# Patient Record
Sex: MALE | Race: WHITE | NOT HISPANIC OR LATINO | Employment: FULL TIME | URBAN - NONMETROPOLITAN AREA
[De-identification: names, ages, dates, MRNs, and addresses within clinical notes are randomized per-mention and may not be internally consistent; named-entity substitution may affect disease eponyms.]

---

## 2023-06-12 ENCOUNTER — OFFICE VISIT (OUTPATIENT)
Dept: PSYCHIATRY | Facility: CLINIC | Age: 53
End: 2023-06-12
Payer: COMMERCIAL

## 2023-06-12 VITALS
DIASTOLIC BLOOD PRESSURE: 84 MMHG | HEART RATE: 98 BPM | BODY MASS INDEX: 26.63 KG/M2 | SYSTOLIC BLOOD PRESSURE: 130 MMHG | WEIGHT: 186 LBS | HEIGHT: 70 IN

## 2023-06-12 DIAGNOSIS — F41.1 GENERALIZED ANXIETY DISORDER: ICD-10-CM

## 2023-06-12 DIAGNOSIS — Z13.39 ADHD (ATTENTION DEFICIT HYPERACTIVITY DISORDER) EVALUATION: Primary | ICD-10-CM

## 2023-06-12 DIAGNOSIS — F33.2 SEVERE EPISODE OF RECURRENT MAJOR DEPRESSIVE DISORDER, WITHOUT PSYCHOTIC FEATURES: ICD-10-CM

## 2023-06-12 PROCEDURE — 90792 PSYCH DIAG EVAL W/MED SRVCS: CPT | Performed by: NURSE PRACTITIONER

## 2023-06-12 RX ORDER — PAROXETINE HYDROCHLORIDE 20 MG/1
TABLET, FILM COATED ORAL
COMMUNITY
Start: 2021-03-04

## 2023-06-12 RX ORDER — CEFDINIR 300 MG/1
CAPSULE ORAL
COMMUNITY
Start: 2023-05-24 | End: 2023-06-12

## 2023-06-12 RX ORDER — PRAVASTATIN SODIUM 40 MG
TABLET ORAL
COMMUNITY
Start: 2023-05-02

## 2023-06-12 RX ORDER — FENOFIBRATE 40 MG/1
TABLET ORAL
COMMUNITY
Start: 2023-05-09

## 2023-06-12 RX ORDER — BUPROPION HYDROCHLORIDE 100 MG/1
100 TABLET, EXTENDED RELEASE ORAL 2 TIMES DAILY
Qty: 60 TABLET | Refills: 1 | Status: SHIPPED | OUTPATIENT
Start: 2023-06-12

## 2023-06-12 RX ORDER — FLUTICASONE PROPIONATE 50 MCG
SPRAY, SUSPENSION (ML) NASAL
COMMUNITY
Start: 2015-02-04

## 2023-06-12 RX ORDER — LATANOPROST 50 UG/ML
SOLUTION/ DROPS OPHTHALMIC
COMMUNITY
Start: 2023-06-06

## 2023-06-12 RX ORDER — MONTELUKAST SODIUM 10 MG/1
TABLET ORAL
COMMUNITY
Start: 2023-05-02

## 2023-06-12 RX ORDER — GABAPENTIN ENACARBIL 300 MG/1
TABLET, EXTENDED RELEASE ORAL
COMMUNITY
Start: 2017-05-04

## 2023-06-12 RX ORDER — OMEPRAZOLE 20 MG/1
CAPSULE, DELAYED RELEASE ORAL
COMMUNITY
Start: 2023-05-08

## 2023-06-12 RX ORDER — LORATADINE 10 MG/1
CAPSULE, LIQUID FILLED ORAL
COMMUNITY
Start: 2013-02-04
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
## 2023-08-03 ENCOUNTER — OFFICE VISIT (OUTPATIENT)
Dept: PSYCHIATRY | Facility: CLINIC | Age: 53
End: 2023-08-03
Payer: COMMERCIAL

## 2023-08-03 VITALS
WEIGHT: 188 LBS | HEIGHT: 70 IN | BODY MASS INDEX: 26.92 KG/M2 | HEART RATE: 106 BPM | SYSTOLIC BLOOD PRESSURE: 122 MMHG | DIASTOLIC BLOOD PRESSURE: 84 MMHG

## 2023-08-03 DIAGNOSIS — F33.1 MODERATE EPISODE OF RECURRENT MAJOR DEPRESSIVE DISORDER: ICD-10-CM

## 2023-08-03 DIAGNOSIS — F41.9 ANXIETY: ICD-10-CM

## 2023-08-03 DIAGNOSIS — F90.2 ADHD (ATTENTION DEFICIT HYPERACTIVITY DISORDER), COMBINED TYPE: Primary | ICD-10-CM

## 2023-08-03 PROBLEM — F32.A DEPRESSIVE DISORDER: Status: ACTIVE | Noted: 2018-03-20

## 2023-08-03 PROBLEM — G25.81 RESTLESS LEGS: Status: ACTIVE | Noted: 2018-03-20

## 2023-08-03 PROBLEM — E78.5 HYPERLIPIDEMIA: Status: ACTIVE | Noted: 2018-03-20

## 2023-08-03 PROBLEM — J45.20 MILD INTERMITTENT ASTHMA: Status: ACTIVE | Noted: 2018-03-20

## 2023-08-03 PROBLEM — R11.0 NAUSEA: Status: ACTIVE | Noted: 2019-03-16

## 2023-08-03 PROBLEM — J30.2 SEASONAL ALLERGIC RHINITIS: Status: ACTIVE | Noted: 2018-03-20

## 2023-08-03 PROBLEM — R68.89 CHANGE IN WEIGHT: Status: ACTIVE | Noted: 2019-03-20

## 2023-08-03 PROBLEM — E66.3 OVERWEIGHT: Status: ACTIVE | Noted: 2018-03-20

## 2023-08-03 PROBLEM — Z22.7 INACTIVE TUBERCULOSIS: Status: ACTIVE | Noted: 2018-03-20

## 2023-08-03 RX ORDER — VILOXAZINE HYDROCHLORIDE 200 MG/1
200 CAPSULE, EXTENDED RELEASE ORAL DAILY
Qty: 14 CAPSULE | Refills: 0 | COMMUNITY
Start: 2023-08-03

## 2023-08-03 RX ORDER — VILOXAZINE HYDROCHLORIDE 100 MG/1
100 CAPSULE, EXTENDED RELEASE ORAL DAILY
Qty: 14 CAPSULE | Refills: 0 | COMMUNITY
Start: 2023-08-03

## 2023-08-03 NOTE — PROGRESS NOTES
"Subjective   Aruna Hernandez is a 53 y.o. male who presents today for initial evaluation     Chief Complaint:  Anxiety, depression and poor concentration     History of Present Illness:   History of Present Illness  Aruna Hernandez presents today for medication management follow-up.  Currently taking Paxil (for past several years) that has been managed by PCP. Was recently prescribed Wellbutrin to help with overall focus, concentration and motivation. Wellbutrin was stopped after he noticed increase in anger and irritability, was also causing constipation.  Says that his  also mentioned that he was more \"thomas\" when taking Wellbutrin.  Reports that symptoms resolved once medication was discontinued.  Continues to endorse symptoms of ADHD including, but not limited to: careless mistakes or not paying attention to directions or people of authority, trouble keeping attention on tasks and during hobbies or leisure activities, does not listen when spoken to directly, does not follow instructions and fails to finish homework chores daily tasks or duties at work, avoids dislikes or doesn't want to do things that require mental effort for a long period of time, loses things needed for tasks, easily distracted, forgetful in daily activities, often fidgets with hands or feet or squirms in seat, often is restless, often has trouble enjoying leisure activities quietly, often talks excessively, and often has trouble waiting one's turn which have caused impairment in important areas of daily functioning.    Voices that he does still continue to struggle with some symptoms of depression and anxiety despite taking medication daily.  Symptoms of depression include no motivation and decreased interest in activities. Symptoms of anxiety include feeling anxious, nervous, on edge, worry, difficulty relaxing, easily annoyed and fidgety.  Feels as though many of the symptoms are possibly related to uncontrolled ADHD. Continues to " write both anxiety and depression 0/10 on a 0-10 scale with 10 being the worst. Sleeping about 6 to 7 hours per night, sometimes takes daytime naps. Denies any changes in appetite.  Denies any SI/HI.  PHQ-9 total score: 14, CARLOS EDUARDO-7 total score: 13.    Previous Psych Meds: Zoloft (10 years), currently taking Paxil (x4 years)     The following portions of the patient's history were reviewed and updated as appropriate: allergies, current medications, past family history, past medical history, past social history, past surgical history and problem list.      Past Medical History:  Past Medical History:   Diagnosis Date    ADHD (attention deficit hyperactivity disorder) 2/28/2023    I have been seeing Dyllan Campbell a local therapist who brought it to my attention.  He did not diagnose, he could not do that.    Depression September 1993    Depressive disorder 3/20/2018       Social History:  Social History     Socioeconomic History    Marital status:    Tobacco Use    Smoking status: Never     Passive exposure: Never    Smokeless tobacco: Never   Vaping Use    Vaping Use: Never used   Substance and Sexual Activity    Alcohol use: Not Currently     Comment: Binge drinker.  No longer drink.    Drug use: Never    Sexual activity: Yes     Partners: Male     Birth control/protection: Condom, Same-sex partner       Family History:  Family History   Problem Relation Age of Onset    Depression Mother         Off and on throughout life.    ADD / ADHD Father         Bio-Dad, never known him. Step dad raised me.    Alcohol abuse Father     Depression Father     Alcohol abuse Maternal Grandfather         Passed away 11/1986    Alcohol abuse Sister         Half sister    Depression Sister     Drug abuse Sister     Paranoid behavior Sister        Past Surgical History:  Past Surgical History:   Procedure Laterality Date    ABDOMINAL SURGERY  2018    Gallbladder removed    COLONOSCOPY  2022    ENDOSCOPY  2021    HERNIA REPAIR   "1972    TONSILLECTOMY  2007       Problem List:  Patient Active Problem List   Diagnosis    Seasonal allergic rhinitis    Restless legs    Overweight    Nausea    Mild intermittent asthma    Inactive tuberculosis    Hyperlipidemia    Depressive disorder    Change in weight         Allergy:   Allergies   Allergen Reactions    Azithromycin Unknown - High Severity    Ibuprofen Unknown - High Severity        Current Medications:   Current Outpatient Medications   Medication Sig Dispense Refill    fenofibrate (FENOGLIDE) 40 MG tablet       fluticasone (FLONASE) 50 MCG/ACT nasal spray       Gabapentin Enacarbil ER (Horizant) 300 MG tablet controlled-release       latanoprost (XALATAN) 0.005 % ophthalmic solution       Loratadine 10 MG capsule       montelukast (SINGULAIR) 10 MG tablet       omeprazole (priLOSEC) 20 MG capsule       PARoxetine (PAXIL) 20 MG tablet       pravastatin (PRAVACHOL) 40 MG tablet       Viloxazine HCl ER (Qelbree) 100 MG capsule sustained-release 24 hr Take 1 capsule by mouth Daily. 14 capsule 0    Viloxazine HCl ER (Qelbree) 200 MG capsule sustained-release 24 hr Take 1 capsule by mouth Daily. 14 capsule 0     No current facility-administered medications for this visit.     Review of Systems   Constitutional:  Negative for activity change, appetite change, unexpected weight gain and unexpected weight loss.   Respiratory:  Negative for shortness of breath.    Cardiovascular:  Negative for chest pain.   Psychiatric/Behavioral:  Positive for decreased concentration, dysphoric mood, sleep disturbance and stress. Negative for suicidal ideas. The patient is nervous/anxious.      Physical Exam  Vitals reviewed.   Constitutional:       General: He is not in acute distress.     Appearance: Normal appearance.   Neurological:      Mental Status: He is alert.      Gait: Gait normal.     Vitals:   Blood pressure 122/84, pulse 106, height 177.8 cm (70\"), weight 85.3 kg (188 lb).    Mental Status Exam: "   Hygiene:   good  Cooperation:  Cooperative  Eye Contact:  Good  Psychomotor Behavior:  Appropriate  Affect:  Appropriate  Mood: normal  Hopelessness: Denies  Speech:  Rapid and Rambling  Thought Process:  Goal directed and Linear  Thought Content:  Mood congruent  Suicidal:  None  Homicidal:  None  Hallucinations:  None  Delusion:  None  Memory:  Intact  Orientation:  Person, Place, Time, and Situation  Reliability:  good  Insight:  Good  Judgement:  Good  Impulse Control:  Good    Lab Results:   No visits with results within 6 Month(s) from this visit.   Latest known visit with results is:   No results found for any previous visit.       EKG Results:  No orders to display       Assessment & Plan   Problems Addressed this Visit    None  Visit Diagnoses       ADHD (attention deficit hyperactivity disorder), combined type    -  Primary    Relevant Medications    Viloxazine HCl ER (Qelbree) 100 MG capsule sustained-release 24 hr    Viloxazine HCl ER (Qelbree) 200 MG capsule sustained-release 24 hr    Anxiety        Moderate episode of recurrent major depressive disorder        Relevant Medications    Viloxazine HCl ER (Qelbree) 100 MG capsule sustained-release 24 hr    Viloxazine HCl ER (Qelbree) 200 MG capsule sustained-release 24 hr          Diagnoses         Codes Comments    ADHD (attention deficit hyperactivity disorder), combined type    -  Primary ICD-10-CM: F90.2  ICD-9-CM: 314.01     Anxiety     ICD-10-CM: F41.9  ICD-9-CM: 300.00     Moderate episode of recurrent major depressive disorder     ICD-10-CM: F33.1  ICD-9-CM: 296.32             Visit Diagnoses:    ICD-10-CM ICD-9-CM   1. ADHD (attention deficit hyperactivity disorder), combined type  F90.2 314.01   2. Anxiety  F41.9 300.00   3. Moderate episode of recurrent major depressive disorder  F33.1 296.32     Yarelispita presents today for medication management follow-up.  Wellbutrin was prescribed last visit, but medication was not tolerated due to  irritability, anger and constipation.  Reports symptoms resolved after medication was discontinued.  Feels that anxiety and depression are adequately controlled with Paxil. Continues to struggle with symptoms that strongly aligned with ADHD.  Meets DSM-V criteria of ADHD based on evaluation, also has positive ASRS (6/6 positive in part A).  CPT 3 completed, revealing only 1 atypical T-score, indicating possible issues with sustained attention and vigilance.  Discussed plan of care and medication regimen/options.  Will continue with Paxil 20 mg daily as previously prescribed and managed by PCP.  Will start Qelbree 100 mg daily x 2 weeks, then increase to 200 mg daily.  Denies any adverse effects of current medication regimen.    -Start Qelbree 100 mg daily x 14 days, then increase to 200 mg daily for ADHD symptoms.  (Samples provided)  -Continue Paxil 20 mg daily as prescribed by PCP.    -Reviewed previous available documentation and most recent available labs.   AMAURI reviewed and is appropriate.     GOALS:  Short Term Goals: Patient will be compliant with medication, and patient will have no significant medication related side effects.  Patient will be engaged in psychotherapy as indicated.  Patient will report subjective improvement of symptoms.  Long term goals: To stabilize mood and treat/improve subjective symptoms, the patient will stay out of the hospital, the patient will be at an optimal level of functioning, and the patient will take all medications as prescribed.  The patient/guardian verbalized understanding and agreement with goals that were mutually set.    TREATMENT PLAN: Continue supportive psychotherapy efforts and medications as indicated for patient's diagnosis.  Pharmacological and Non-Pharmacological treatment options discussed during today's visit. Patient/Guardian acknowledged and verbally consented with current treatment plan and was educated on the importance of compliance with treatment  and follow-up appointments.      MEDICATION ISSUES:  Discussed medication options and treatment plan of prescribed medication as well as the risks, benefits, any black box warnings, and side effects including potential falls, possible impaired driving, and metabolic adversities among others. Patient is agreeable to call the office with any worsening of symptoms or onset of side effects, or if any concerns or questions arise.  The contact information for the office is made available to the patient. Patient is agreeable to call 911 or go to the nearest ER should they begin having any SI/HI, or if any urgent concerns arise. No medication side effects or related complaints today.     MEDS ORDERED DURING VISIT:  New Medications Ordered This Visit   Medications    Viloxazine HCl ER (Qelbree) 100 MG capsule sustained-release 24 hr     Sig: Take 1 capsule by mouth Daily.     Dispense:  14 capsule     Refill:  0     Order Specific Question:   Lot Number?     Answer:   [id number]     Order Specific Question:   Expiration Date?     Answer:   1/31/2024     Order Specific Question:   Quantity     Answer:   14    Viloxazine HCl ER (Qelbree) 200 MG capsule sustained-release 24 hr     Sig: Take 1 capsule by mouth Daily.     Dispense:  14 capsule     Refill:  0     Order Specific Question:   Lot Number?     Answer:   [id number]     Order Specific Question:   Expiration Date?     Answer:   1/31/2025     Order Specific Question:   Quantity     Answer:   14       FOLLOW UP:  Return in about 4 weeks (around 8/31/2023) for Recheck.             This document has been electronically signed by CECILIA Walters  August 8, 2023 12:52 EDT    Please note that portions of this note were completed with a voice recognition program. Efforts were made to edit dictation, but occasionally words are mistranscribed.

## 2023-08-29 DIAGNOSIS — F90.2 ADHD (ATTENTION DEFICIT HYPERACTIVITY DISORDER), COMBINED TYPE: ICD-10-CM

## 2023-08-29 RX ORDER — VILOXAZINE HYDROCHLORIDE 200 MG/1
200 CAPSULE, EXTENDED RELEASE ORAL DAILY
Qty: 30 CAPSULE | Refills: 2 | Status: SHIPPED | OUTPATIENT
Start: 2023-08-29

## 2023-08-29 RX ORDER — VILOXAZINE HYDROCHLORIDE 200 MG/1
200 CAPSULE, EXTENDED RELEASE ORAL DAILY
Qty: 14 CAPSULE | Refills: 0 | Status: CANCELLED | COMMUNITY
Start: 2023-08-29

## 2023-08-29 NOTE — TELEPHONE ENCOUNTER
Patient called back and stated that Qelbree 200MG has been working well for him and would like to get a refill of that. States that he started taking in the morning because it was keeping him up at night and it's working really well for him now.

## 2023-08-29 NOTE — TELEPHONE ENCOUNTER
Called to find out how patient was doing on Qelbree 200 MG. Left pt message to call us back at their earliest convenience.

## 2023-09-13 ENCOUNTER — OFFICE VISIT (OUTPATIENT)
Dept: PSYCHIATRY | Facility: CLINIC | Age: 53
End: 2023-09-13
Payer: COMMERCIAL

## 2023-09-13 VITALS
WEIGHT: 180 LBS | DIASTOLIC BLOOD PRESSURE: 78 MMHG | OXYGEN SATURATION: 99 % | HEIGHT: 70 IN | HEART RATE: 102 BPM | SYSTOLIC BLOOD PRESSURE: 114 MMHG | BODY MASS INDEX: 25.77 KG/M2

## 2023-09-13 DIAGNOSIS — F41.9 ANXIETY: ICD-10-CM

## 2023-09-13 DIAGNOSIS — F90.2 ADHD (ATTENTION DEFICIT HYPERACTIVITY DISORDER), COMBINED TYPE: Primary | ICD-10-CM

## 2023-09-13 DIAGNOSIS — F33.1 MODERATE EPISODE OF RECURRENT MAJOR DEPRESSIVE DISORDER: ICD-10-CM

## 2023-09-13 RX ORDER — PAROXETINE HYDROCHLORIDE 40 MG/1
40 TABLET, FILM COATED ORAL EVERY MORNING
COMMUNITY
Start: 2023-08-07

## 2023-09-13 RX ORDER — VILOXAZINE HYDROCHLORIDE 200 MG/1
400 CAPSULE, EXTENDED RELEASE ORAL DAILY
Qty: 60 CAPSULE | Refills: 2 | Status: SHIPPED | OUTPATIENT
Start: 2023-09-13

## 2023-09-13 NOTE — PROGRESS NOTES
Subjective   Aruna Hernandez is a 53 y.o. male who presents today for follow up    Chief Complaint:  Anxiety, depression and poor concentration     History of Present Illness:   History of Present Illness  Aruna Hernandez presents today for medication management follow up. Taking Paxil that is managed by PCP with recent dose increase. Also taking Qelbree that was prescribed last visit here on 8/3/23. Reports that he continues to struggle with inability to complete tasks. Uses example of recently pausing 2 programs for 2 weeks due to inability to finish with trying to complete multiple tasks at one time. Says that house is still a mess, feels that initiating household chores is difficult. Reports being more emotional, low mood, sad and feels could cry at any moment. Recently see PCP and restarted cholesterol medication. He does reports being able to focus and write more poetry. Says that he has noticed no adverse effects since starting Qelbree.  Reports sleeping about 7 hours per night. Denies appetite changes. PHQ-9 Total Score: 14, CARLOS EDUARDO-7 Total Score: 12    Previous Psych Meds: Zoloft (10 years), currently taking Paxil (x4 years), Wellbutrin (anger and irritability), Qelbree (ineffective), W     The following portions of the patient's history were reviewed and updated as appropriate: allergies, current medications, past family history, past medical history, past social history, past surgical history and problem list.      Past Medical History:  Past Medical History:   Diagnosis Date    ADHD (attention deficit hyperactivity disorder) 2/28/2023    I have been seeing Dyllan Campbell a local therapist who brought it to my attention.  He did not diagnose, he could not do that.    Depression September 1993    Depressive disorder 3/20/2018       Social History:  Social History     Socioeconomic History    Marital status:    Tobacco Use    Smoking status: Never     Passive exposure: Never    Smokeless tobacco: Never    Vaping Use    Vaping Use: Never used   Substance and Sexual Activity    Alcohol use: Not Currently     Comment: Binge drinker.  No longer drink.    Drug use: Never    Sexual activity: Yes     Partners: Male     Birth control/protection: Condom, Same-sex partner       Family History:  Family History   Problem Relation Age of Onset    Depression Mother         Off and on throughout life.    ADD / ADHD Father         Bio-Dad, never known him. Step dad raised me.    Alcohol abuse Father     Depression Father     Alcohol abuse Maternal Grandfather         Passed away 11/1986    Alcohol abuse Sister         Half sister    Depression Sister     Drug abuse Sister     Paranoid behavior Sister        Past Surgical History:  Past Surgical History:   Procedure Laterality Date    ABDOMINAL SURGERY  2018    Gallbladder removed    COLONOSCOPY  2022    ENDOSCOPY  2021    HERNIA REPAIR  1972    TONSILLECTOMY  2007       Problem List:  Patient Active Problem List   Diagnosis    Seasonal allergic rhinitis    Restless legs    Overweight    Nausea    Mild intermittent asthma    Inactive tuberculosis    Hyperlipidemia    Depressive disorder    Change in weight         Allergy:   Allergies   Allergen Reactions    Azithromycin Unknown - High Severity    Ibuprofen Unknown - High Severity        Current Medications:   Current Outpatient Medications   Medication Sig Dispense Refill    fenofibrate (FENOGLIDE) 40 MG tablet       fluticasone (FLONASE) 50 MCG/ACT nasal spray       Gabapentin Enacarbil ER (Horizant) 300 MG tablet controlled-release       latanoprost (XALATAN) 0.005 % ophthalmic solution       Loratadine 10 MG capsule       montelukast (SINGULAIR) 10 MG tablet       omeprazole (priLOSEC) 20 MG capsule       PARoxetine (PAXIL) 40 MG tablet Take 1 tablet by mouth Every Morning.      pravastatin (PRAVACHOL) 40 MG tablet       Viloxazine HCl ER (Qelbree) 200 MG capsule sustained-release 24 hr Take 2 capsules by mouth Daily. 60  "capsule 2     No current facility-administered medications for this visit.     Review of Systems   Constitutional:  Negative for activity change, appetite change, unexpected weight gain and unexpected weight loss.   Respiratory:  Negative for shortness of breath.    Cardiovascular:  Negative for chest pain.   Psychiatric/Behavioral:  Positive for decreased concentration, dysphoric mood, sleep disturbance and stress. Negative for suicidal ideas. The patient is nervous/anxious.      Physical Exam  Vitals reviewed.   Constitutional:       General: He is not in acute distress.     Appearance: Normal appearance.   Neurological:      Mental Status: He is alert.      Gait: Gait normal.     Vitals:   Blood pressure 114/78, pulse 102, height 177.8 cm (70\"), weight 81.6 kg (180 lb), SpO2 99 %.    Mental Status Exam:   Hygiene:   good  Cooperation:  Cooperative  Eye Contact:  Good  Psychomotor Behavior:  Appropriate  Affect:  Appropriate  Mood: normal  Hopelessness: Denies  Speech:   Talkative  Thought Process:  Goal directed and Linear  Thought Content:  Mood congruent  Suicidal:  None  Homicidal:  None  Hallucinations:  None  Delusion:  None  Memory:  Intact  Orientation:  Person, Place, Time, and Situation  Reliability:  good  Insight:  Good  Judgement:  Good  Impulse Control:  Good    Lab Results:   No visits with results within 6 Month(s) from this visit.   Latest known visit with results is:   No results found for any previous visit.       EKG Results:  No orders to display       Assessment & Plan   Problems Addressed this Visit    None  Visit Diagnoses       ADHD (attention deficit hyperactivity disorder), combined type    -  Primary    Relevant Medications    PARoxetine (PAXIL) 40 MG tablet    Viloxazine HCl ER (Qelbree) 200 MG capsule sustained-release 24 hr    Anxiety        Moderate episode of recurrent major depressive disorder        Relevant Medications    PARoxetine (PAXIL) 40 MG tablet    Viloxazine HCl ER " (Qelbree) 200 MG capsule sustained-release 24 hr          Diagnoses         Codes Comments    ADHD (attention deficit hyperactivity disorder), combined type    -  Primary ICD-10-CM: F90.2  ICD-9-CM: 314.01     Anxiety     ICD-10-CM: F41.9  ICD-9-CM: 300.00     Moderate episode of recurrent major depressive disorder     ICD-10-CM: F33.1  ICD-9-CM: 296.32             Visit Diagnoses:    ICD-10-CM ICD-9-CM   1. ADHD (attention deficit hyperactivity disorder), combined type  F90.2 314.01   2. Anxiety  F41.9 300.00   3. Moderate episode of recurrent major depressive disorder  F33.1 296.32     Aruna presents today for medication management follow-up.  He voices feeling easily overwhelmed and having increased stressors, especially at work. Reports that he still struggles with ADHD symptoms including difficulty initiating/completing tasks and tends to hyperfocus on unimportant things.  He denies experiencing any adverse effects from calvarium and has noticed no mood changes. He does voice noticing improvement in writing poetry. Also feeling more emotional, PHQ-9 score of 14 indicating moderate depression, CARLOS EDUARDO-7 score of 12 indicating moderate anxiety. Reports that he feels the Paxil works adequately to control anxiety and depression.  Discussed plan and medications, will increase Qelbree from 200 mg to 400 mg daily to help with better control of ADHD symptoms.  Will continue with Paxil 40 mg as managed by PCP.  Denies any adverse effects of current medication regimen.    -Increase Qelbree from 200 mg to 400 mg daily for ADHD symptoms.   -Continue Paxil 40 mg daily as prescribed by PCP.    -Reviewed previous available documentation and most recent available labs. AMAURI reviewed and is appropriate.     GOALS:  Short Term Goals: Patient will be compliant with medication, and patient will have no significant medication related side effects.  Patient will be engaged in psychotherapy as indicated.  Patient will report  subjective improvement of symptoms.  Long term goals: To stabilize mood and treat/improve subjective symptoms, the patient will stay out of the hospital, the patient will be at an optimal level of functioning, and the patient will take all medications as prescribed.  The patient/guardian verbalized understanding and agreement with goals that were mutually set.    TREATMENT PLAN: Continue supportive psychotherapy efforts and medications as indicated for patient's diagnosis.  Pharmacological and Non-Pharmacological treatment options discussed during today's visit. Patient/Guardian acknowledged and verbally consented with current treatment plan and was educated on the importance of compliance with treatment and follow-up appointments.      MEDICATION ISSUES:  Discussed medication options and treatment plan of prescribed medication as well as the risks, benefits, any black box warnings, and side effects including potential falls, possible impaired driving, and metabolic adversities among others. Patient is agreeable to call the office with any worsening of symptoms or onset of side effects, or if any concerns or questions arise.  The contact information for the office is made available to the patient. Patient is agreeable to call 911 or go to the nearest ER should they begin having any SI/HI, or if any urgent concerns arise. No medication side effects or related complaints today.     MEDS ORDERED DURING VISIT:  New Medications Ordered This Visit   Medications    Viloxazine HCl ER (Qelbree) 200 MG capsule sustained-release 24 hr     Sig: Take 2 capsules by mouth Daily.     Dispense:  60 capsule     Refill:  2       FOLLOW UP:  Return in about 6 weeks (around 10/25/2023) for Recheck.             This document has been electronically signed by CECILIA Walters  September 27, 2023 19:59 EDT    Please note that portions of this note were completed with a voice recognition program. Efforts were made to edit dictation, but  occasionally words are mistranscribed.

## 2023-10-26 ENCOUNTER — OFFICE VISIT (OUTPATIENT)
Dept: PSYCHIATRY | Facility: CLINIC | Age: 53
End: 2023-10-26
Payer: COMMERCIAL

## 2023-10-26 VITALS
OXYGEN SATURATION: 98 % | WEIGHT: 170 LBS | DIASTOLIC BLOOD PRESSURE: 62 MMHG | HEIGHT: 70 IN | BODY MASS INDEX: 24.34 KG/M2 | SYSTOLIC BLOOD PRESSURE: 120 MMHG | HEART RATE: 113 BPM

## 2023-10-26 DIAGNOSIS — F33.1 MODERATE EPISODE OF RECURRENT MAJOR DEPRESSIVE DISORDER: ICD-10-CM

## 2023-10-26 DIAGNOSIS — Z79.899 MEDICATION MANAGEMENT: ICD-10-CM

## 2023-10-26 DIAGNOSIS — F90.2 ADHD (ATTENTION DEFICIT HYPERACTIVITY DISORDER), COMBINED TYPE: Primary | ICD-10-CM

## 2023-10-26 DIAGNOSIS — F41.9 ANXIETY: ICD-10-CM

## 2023-10-26 RX ORDER — DEXTROAMPHETAMINE SACCHARATE, AMPHETAMINE ASPARTATE MONOHYDRATE, DEXTROAMPHETAMINE SULFATE AND AMPHETAMINE SULFATE 2.5; 2.5; 2.5; 2.5 MG/1; MG/1; MG/1; MG/1
10 CAPSULE, EXTENDED RELEASE ORAL DAILY
Qty: 15 CAPSULE | Refills: 0 | Status: SHIPPED | OUTPATIENT
Start: 2023-10-26 | End: 2023-11-07 | Stop reason: SDUPTHER

## 2023-10-26 NOTE — PROGRESS NOTES
Subjective   Aruna Hernandez is a 53 y.o. male who presents today for follow up    Chief Complaint:  Anxiety, depression and poor concentration     History of Present Illness:   History of Present Illness  Aruna Hernandez presents today for medication management follow-up.  Currently taking Qelbree 200 mg daily and Paxil 40 mg daily that was initially prescribed and is currently managed by PCP.  Voices that he continues to struggle with ADHD symptoms, struggles to initiate/complete tasks, becomes easily distracted, has multiple tasks going at once and is easily overwhelmed.  Says that he is also forgetful, frequently makes lists then loses the list.  Reports increased stressors at work that have resulted in increased anxiety and depression.  Reports that he is sleeping well, no longer napping during the day.  Says that he has been exercising and walking the dogs.  Continues to see therapist routinely.  Denies appetite changes.  Denies SI/HI.  PHQ-9 total score: 11, CARLOS EDUARDO-7 total score: 14.    Previous Psych Meds: Zoloft (10 years), currently taking Paxil (x4 years), Wellbutrin (anger and irritability), Qelbree (ineffective)     The following portions of the patient's history were reviewed and updated as appropriate: allergies, current medications, past family history, past medical history, past social history, past surgical history and problem list.      Past Medical History:  Past Medical History:   Diagnosis Date    ADHD (attention deficit hyperactivity disorder) 2/28/2023    I have been seeing Dyllan Campbell a local therapist who brought it to my attention.  He did not diagnose, he could not do that.    Depression September 1993    Depressive disorder 3/20/2018       Social History:  Social History     Socioeconomic History    Marital status:    Tobacco Use    Smoking status: Never     Passive exposure: Never    Smokeless tobacco: Never   Vaping Use    Vaping Use: Never used   Substance and Sexual Activity     Alcohol use: Not Currently     Comment: Binge drinker.  No longer drink.    Drug use: Never    Sexual activity: Yes     Partners: Male     Birth control/protection: Condom, Same-sex partner       Family History:  Family History   Problem Relation Age of Onset    Depression Mother         Off and on throughout life.    ADD / ADHD Father         Bio-Dad, never known him. Step dad raised me.    Alcohol abuse Father     Depression Father     Alcohol abuse Maternal Grandfather         Passed away 11/1986    Alcohol abuse Sister         Half sister    Depression Sister     Drug abuse Sister     Paranoid behavior Sister        Past Surgical History:  Past Surgical History:   Procedure Laterality Date    ABDOMINAL SURGERY  2018    Gallbladder removed    COLONOSCOPY  2022    ENDOSCOPY  2021    HERNIA REPAIR  1972    TONSILLECTOMY  2007       Problem List:  Patient Active Problem List   Diagnosis    Seasonal allergic rhinitis    Restless legs    Overweight    Nausea    Mild intermittent asthma    Inactive tuberculosis    Hyperlipidemia    Depressive disorder    Change in weight         Allergy:   Allergies   Allergen Reactions    Azithromycin Unknown - High Severity    Ibuprofen Unknown - High Severity        Current Medications:   Current Outpatient Medications   Medication Sig Dispense Refill    fenofibrate (FENOGLIDE) 40 MG tablet       fluticasone (FLONASE) 50 MCG/ACT nasal spray       Gabapentin Enacarbil ER (Horizant) 300 MG tablet controlled-release       latanoprost (XALATAN) 0.005 % ophthalmic solution       Loratadine 10 MG capsule       montelukast (SINGULAIR) 10 MG tablet       omeprazole (priLOSEC) 20 MG capsule       PARoxetine (PAXIL) 40 MG tablet Take 1 tablet by mouth Every Morning.      pravastatin (PRAVACHOL) 40 MG tablet       amphetamine-dextroamphetamine XR (Adderall XR) 10 MG 24 hr capsule Take 1 capsule by mouth Every Morning 30 capsule 0     No current facility-administered medications for this  "visit.     Review of Systems   Constitutional:  Negative for activity change, appetite change, unexpected weight gain and unexpected weight loss.   Respiratory:  Negative for shortness of breath.    Cardiovascular:  Negative for chest pain.   Psychiatric/Behavioral:  Positive for decreased concentration, dysphoric mood, sleep disturbance and stress. Negative for suicidal ideas. The patient is nervous/anxious.      Physical Exam  Vitals reviewed.   Constitutional:       General: He is not in acute distress.     Appearance: Normal appearance.   Neurological:      Mental Status: He is alert.      Gait: Gait normal.     Vitals:   Blood pressure 120/62, pulse 113, height 177.8 cm (70\"), weight 77.1 kg (170 lb), SpO2 98%.    Mental Status Exam:   Hygiene:   good  Cooperation:  Cooperative  Eye Contact:  Good  Psychomotor Behavior:  Appropriate  Affect:  Appropriate  Mood: normal  Hopelessness: Denies  Speech:   Talkative  Thought Process:  Goal directed and Linear  Thought Content:  Mood congruent  Suicidal:  None  Homicidal:  None  Hallucinations:  None  Delusion:  None  Memory:  Intact  Orientation:  Person, Place, Time, and Situation  Reliability:  good  Insight:  Good  Judgement:  Good  Impulse Control:  Good    Lab Results:   Office Visit on 10/26/2023   Component Date Value Ref Range Status    Report Summary 10/26/2023 FINAL   Final    Comment: ====================================================================  [facility] DRUG ANALYSIS,[facility]  ====================================================================  Test                             Result       Flag       Units  Drug Present and Declared for Prescription Verification    Paroxetine                     PRESENT      EXPECTED  Drug Present not Declared for Prescription Verification    Tizanidine                     PRESENT      UNEXPECTED  Drug Absent but Declared for Prescription Verification    Gabapentin                     Not Detected " UNEXPECTED  ====================================================================  Test                      Result    Flag   Units      Ref Range    Creatinine              133              mg/dL      >=20  ====================================================================  Declared Medications:   The flagging and interpretation on this report are based on the   following declared medications.  Unexpected results may arise from                              inaccuracies in the declared medications.   **Note: The testing scope of this panel includes these medications:   Gabapentin   Paroxetine   **Note: The testing scope of this panel does not include following   reported medications:   Fenofibrate   Fluticasone   Latanoprost   Loratadine   Montelukast   Omeprazole   Pravastatin   Viloxazine  ====================================================================  For clinical consultation, please call [phone].  ====================================================================         EKG Results:  No orders to display       Assessment & Plan   Problems Addressed this Visit    None  Visit Diagnoses       ADHD (attention deficit hyperactivity disorder), combined type    -  Primary    Relevant Orders    Compliance Drug Analysis, Ur - Urine, Clean Catch (Completed)    Medication management        Relevant Orders    Compliance Drug Analysis, Ur - Urine, Clean Catch (Completed)    Moderate episode of recurrent major depressive disorder        Anxiety              Diagnoses         Codes Comments    ADHD (attention deficit hyperactivity disorder), combined type    -  Primary ICD-10-CM: F90.2  ICD-9-CM: 314.01     Medication management     ICD-10-CM: Z79.899  ICD-9-CM: V58.69     Moderate episode of recurrent major depressive disorder     ICD-10-CM: F33.1  ICD-9-CM: 296.32     Anxiety     ICD-10-CM: F41.9  ICD-9-CM: 300.00             Visit Diagnoses:    ICD-10-CM ICD-9-CM   1. ADHD (attention deficit hyperactivity  disorder), combined type  F90.2 314.01   2. Medication management  Z79.899 V58.69   3. Moderate episode of recurrent major depressive disorder  F33.1 296.32   4. Anxiety  F41.9 300.00     Quinton presents today for medication management follow-up.  Reports increase in depression that could likely be situational as he is having difficulty at work.  Continues to struggle with ADHD symptoms despite taking Qelbree.  Voices that he does not feel medication has been beneficial in decreasing ADHD symptoms.  PHQ-9 score of 11 indicating moderate depression, CARLOS EDUARDO-7 score of 14 indicating moderate anxiety.  Discussed plan of care and medication regimen/options.  Will taper/discontinue Qelbree and start Adderall XR 10 mg daily.  Agreeable to have UDS completed today, will send Adderall XR 10 mg x 15 days once results have been received and he will call office to update. Agreeable to continue with Paxil 40 mg daily as previously prescribed by PCP.  Denies any adverse effects of medication.    -Start Adderall XR 10 mg daily x 15 days once UDS results have been received  -Continue Paxil 40 mg daily as prescribed by PCP.    -Reviewed previous available documentation and most recent available labs. AMAURI reviewed and is appropriate.     GOALS:  Short Term Goals: Patient will be compliant with medication, and patient will have no significant medication related side effects.  Patient will be engaged in psychotherapy as indicated.  Patient will report subjective improvement of symptoms.  Long term goals: To stabilize mood and treat/improve subjective symptoms, the patient will stay out of the hospital, the patient will be at an optimal level of functioning, and the patient will take all medications as prescribed.  The patient/guardian verbalized understanding and agreement with goals that were mutually set.    TREATMENT PLAN: Continue supportive psychotherapy efforts and medications as indicated for patient's diagnosis.   Pharmacological and Non-Pharmacological treatment options discussed during today's visit. Patient/Guardian acknowledged and verbally consented with current treatment plan and was educated on the importance of compliance with treatment and follow-up appointments.      MEDICATION ISSUES:  Discussed medication options and treatment plan of prescribed medication as well as the risks, benefits, any black box warnings, and side effects including potential falls, possible impaired driving, and metabolic adversities among others. Patient is agreeable to call the office with any worsening of symptoms or onset of side effects, or if any concerns or questions arise.  The contact information for the office is made available to the patient. Patient is agreeable to call 911 or go to the nearest ER should they begin having any SI/HI, or if any urgent concerns arise. No medication side effects or related complaints today.     MEDS ORDERED DURING VISIT:  No orders of the defined types were placed in this encounter.      FOLLOW UP:  Return in about 6 weeks (around 12/7/2023) for Recheck.             This document has been electronically signed by CECILIA Walters  November 9, 2023 23:52 EST    Please note that portions of this note were completed with a voice recognition program. Efforts were made to edit dictation, but occasionally words are mistranscribed.

## 2023-11-02 LAB — DRUGS UR: NORMAL

## 2023-11-07 ENCOUNTER — TELEPHONE (OUTPATIENT)
Dept: PSYCHIATRY | Facility: CLINIC | Age: 53
End: 2023-11-07
Payer: COMMERCIAL

## 2023-11-07 DIAGNOSIS — F90.2 ADHD (ATTENTION DEFICIT HYPERACTIVITY DISORDER), COMBINED TYPE: ICD-10-CM

## 2023-11-07 RX ORDER — DEXTROAMPHETAMINE SACCHARATE, AMPHETAMINE ASPARTATE MONOHYDRATE, DEXTROAMPHETAMINE SULFATE AND AMPHETAMINE SULFATE 2.5; 2.5; 2.5; 2.5 MG/1; MG/1; MG/1; MG/1
10 CAPSULE, EXTENDED RELEASE ORAL EVERY MORNING
Qty: 30 CAPSULE | Refills: 0 | Status: SHIPPED | OUTPATIENT
Start: 2023-11-07

## 2023-11-07 NOTE — TELEPHONE ENCOUNTER
Patient states that the Adderall XR has not caused any negative side effects. He said he is doing a little bit better. He also asked about urine drug screen report that he didn't recognize. He states he has not taken Tizanidine and does not know where this has come from.

## 2023-12-15 DIAGNOSIS — F90.2 ADHD (ATTENTION DEFICIT HYPERACTIVITY DISORDER), COMBINED TYPE: ICD-10-CM

## 2023-12-18 RX ORDER — DEXTROAMPHETAMINE SACCHARATE, AMPHETAMINE ASPARTATE MONOHYDRATE, DEXTROAMPHETAMINE SULFATE AND AMPHETAMINE SULFATE 2.5; 2.5; 2.5; 2.5 MG/1; MG/1; MG/1; MG/1
10 CAPSULE, EXTENDED RELEASE ORAL EVERY MORNING
Qty: 30 CAPSULE | Refills: 0 | Status: SHIPPED | OUTPATIENT
Start: 2023-12-18 | End: 2023-12-21 | Stop reason: DRUGHIGH

## 2023-12-21 ENCOUNTER — OFFICE VISIT (OUTPATIENT)
Dept: PSYCHIATRY | Facility: CLINIC | Age: 53
End: 2023-12-21
Payer: COMMERCIAL

## 2023-12-21 VITALS
BODY MASS INDEX: 25.62 KG/M2 | WEIGHT: 179 LBS | SYSTOLIC BLOOD PRESSURE: 134 MMHG | OXYGEN SATURATION: 99 % | HEART RATE: 94 BPM | HEIGHT: 70 IN | DIASTOLIC BLOOD PRESSURE: 86 MMHG

## 2023-12-21 DIAGNOSIS — F90.2 ADHD (ATTENTION DEFICIT HYPERACTIVITY DISORDER), COMBINED TYPE: ICD-10-CM

## 2023-12-21 PROCEDURE — 99214 OFFICE O/P EST MOD 30 MIN: CPT | Performed by: NURSE PRACTITIONER

## 2023-12-21 RX ORDER — DEXTROAMPHETAMINE SACCHARATE, AMPHETAMINE ASPARTATE MONOHYDRATE, DEXTROAMPHETAMINE SULFATE AND AMPHETAMINE SULFATE 5; 5; 5; 5 MG/1; MG/1; MG/1; MG/1
20 CAPSULE, EXTENDED RELEASE ORAL EVERY MORNING
Qty: 30 CAPSULE | Refills: 0 | Status: SHIPPED | OUTPATIENT
Start: 2023-12-21

## 2023-12-21 NOTE — PROGRESS NOTES
Subjective   Aruna Hernandez is a 53 y.o. male who presents today for follow up    Chief Complaint:  Anxiety, depression and poor concentration     History of Present Illness:   History of Present Illness  Aruna Hernandez presents today for medication management follow-up.  Currently taking Adderall XR 10 mg daily and Paxil 40 mg daily.  Paxil is currently managed by PCP.  Reports that he has been really busy with work and house renovations.  Has noticed no significant improvement in ADHD symptoms since starting medication.  Continues to become easily distracted, loses items needed for daily tasks and struggles to maintain focus. Recently started back to school ([employer]), needs 4 classes to graduate with a bachelor's in business management and hopes to obtain master's degree. Says that he recently had an incident at work, spoken to by upper management for sending emails that someone felt were offensive.  Reports that he is sleeping well and appetite is good. PHQ-9 total score: 9 (previously 11), CARLOS EDUARDO-7 total score: 7 (previously 14).    Previous Psych Meds: Zoloft (10 years), currently taking Paxil (x4 years), Wellbutrin (anger and irritability), Qelbree (ineffective)     The following portions of the patient's history were reviewed and updated as appropriate: allergies, current medications, past family history, past medical history, past social history, past surgical history and problem list.      Past Medical History:  Past Medical History:   Diagnosis Date    ADHD (attention deficit hyperactivity disorder) 2/28/2023    I have been seeing Dyllan Campbell a local therapist who brought it to my attention.  He did not diagnose, he could not do that.    Depression September 1993    Depressive disorder 03/20/2018       Social History:  Social History     Socioeconomic History    Marital status:    Tobacco Use    Smoking status: Never     Passive exposure: Never    Smokeless tobacco: Never   Vaping Use    Vaping  Use: Never used   Substance and Sexual Activity    Alcohol use: Not Currently     Comment: Binge drinker.  No longer drink.    Drug use: Never    Sexual activity: Yes     Partners: Male     Birth control/protection: Condom, Same-sex partner       Family History:  Family History   Problem Relation Age of Onset    Depression Mother         Off and on throughout life.    ADD / ADHD Father         Bio-Dad, never known him. Step dad raised me.    Alcohol abuse Father     Depression Father     Alcohol abuse Maternal Grandfather         Passed away 11/1986    Alcohol abuse Sister         Half sister    Depression Sister     Drug abuse Sister     Paranoid behavior Sister        Past Surgical History:  Past Surgical History:   Procedure Laterality Date    ABDOMINAL SURGERY  2018    Gallbladder removed    COLONOSCOPY  2022    ENDOSCOPY  2021    HERNIA REPAIR  1972    TONSILLECTOMY  2007       Problem List:  Patient Active Problem List   Diagnosis    Seasonal allergic rhinitis    Restless legs    Overweight    Nausea    Mild intermittent asthma    Inactive tuberculosis    Hyperlipidemia    Depressive disorder    Change in weight         Allergy:   Allergies   Allergen Reactions    Azithromycin Unknown - High Severity    Ibuprofen Unknown - High Severity        Current Medications:   Current Outpatient Medications   Medication Sig Dispense Refill    fenofibrate (FENOGLIDE) 40 MG tablet       fluticasone (FLONASE) 50 MCG/ACT nasal spray       Gabapentin Enacarbil ER (Horizant) 300 MG tablet controlled-release       latanoprost (XALATAN) 0.005 % ophthalmic solution       Loratadine 10 MG capsule       montelukast (SINGULAIR) 10 MG tablet       omeprazole (priLOSEC) 20 MG capsule       PARoxetine (PAXIL) 40 MG tablet Take 1 tablet by mouth Every Morning.      pravastatin (PRAVACHOL) 40 MG tablet       amphetamine-dextroamphetamine XR (Adderall XR) 20 MG 24 hr capsule Take 1 capsule by mouth Every Morning 30 capsule 0     No  "current facility-administered medications for this visit.     Review of Systems   Constitutional:  Negative for activity change, appetite change, unexpected weight gain and unexpected weight loss.   Respiratory:  Negative for shortness of breath.    Cardiovascular:  Negative for chest pain.   Psychiatric/Behavioral:  Positive for decreased concentration, dysphoric mood, sleep disturbance and stress. Negative for suicidal ideas. The patient is nervous/anxious.      Physical Exam  Vitals reviewed.   Constitutional:       General: He is not in acute distress.     Appearance: Normal appearance.   Neurological:      Mental Status: He is alert.      Gait: Gait normal.     Vitals:   Blood pressure 134/86, pulse 94, height 177.8 cm (70\"), weight 81.2 kg (179 lb), SpO2 99%.    Mental Status Exam:   Hygiene:   good  Cooperation:  Cooperative  Eye Contact:  Good  Psychomotor Behavior:  Appropriate  Affect:  Appropriate  Mood: normal  Hopelessness: Denies  Speech:   Talkative  Thought Process:  Goal directed and Linear  Thought Content:  Mood congruent  Suicidal:  None  Homicidal:  None  Hallucinations:  None  Delusion:  None  Memory:  Intact  Orientation:  Person, Place, Time, and Situation  Reliability:  good  Insight:  Good  Judgement:  Good  Impulse Control:  Good    Lab Results:   Office Visit on 10/26/2023   Component Date Value Ref Range Status    Report Summary 10/26/2023 FINAL   Final    Comment: ====================================================================  [facility] DRUG ANALYSIS[facility]  ====================================================================  Test                             Result       Flag       Units  Drug Present and Declared for Prescription Verification    Paroxetine                     PRESENT      EXPECTED  Drug Present not Declared for Prescription Verification    Tizanidine                     PRESENT      UNEXPECTED  Drug Absent but Declared for Prescription Verification    " Gabapentin                     Not Detected UNEXPECTED  ====================================================================  Test                      Result    Flag   Units      Ref Range    Creatinine              133              mg/dL      >=20  ====================================================================  Declared Medications:   The flagging and interpretation on this report are based on the   following declared medications.  Unexpected results may arise from                              inaccuracies in the declared medications.   **Note: The testing scope of this panel includes these medications:   Gabapentin   Paroxetine   **Note: The testing scope of this panel does not include following   reported medications:   Fenofibrate   Fluticasone   Latanoprost   Loratadine   Montelukast   Omeprazole   Pravastatin   Viloxazine  ====================================================================  For clinical consultation, please call [phone].  ====================================================================         EKG Results:  No orders to display       Assessment & Plan   Problems Addressed this Visit    None  Visit Diagnoses       ADHD (attention deficit hyperactivity disorder), combined type        Relevant Medications    amphetamine-dextroamphetamine XR (Adderall XR) 20 MG 24 hr capsule          Diagnoses         Codes Comments    ADHD (attention deficit hyperactivity disorder), combined type     ICD-10-CM: F90.2  ICD-9-CM: 314.01             Visit Diagnoses:    ICD-10-CM ICD-9-CM   1. ADHD (attention deficit hyperactivity disorder), combined type  F90.2 314.01     Craigory presents today for medication management follow-up.  Has noticed no improvement in ADHD symptoms since starting Adderall XR 10 mg daily.  Continues to struggle with being easily distracted, forgetful and losing items needed daily tasks.  Also struggles with initiating and completing tasks.  Feels that overall anxiety and  depression are adequately controlled with Paxil.  Discussed plan and medication regimen, agreeable to increase Adderall XR from 10 mg to 20 mg daily help with better management of ADHD symptoms.  Will continue with Paxil 40 mg daily is managed by PCP.  Denies any adverse effects of medication regimen.    -Increase Adderall XR from 10 mg to 20 mg daily (last Rx obtained 12/21, we will increase to 20 mg daily with current prescription. This will last a total of 14 days, then he will call to update and request refill of new dose if appropriate)  -Continue Paxil 40 mg daily as prescribed by PCP.    -Reviewed previous available documentation and most recent available labs. AMAURI reviewed and is appropriate.     GOALS:  Short Term Goals: Patient will be compliant with medication, and patient will have no significant medication related side effects.  Patient will be engaged in psychotherapy as indicated.  Patient will report subjective improvement of symptoms.  Long term goals: To stabilize mood and treat/improve subjective symptoms, the patient will stay out of the hospital, the patient will be at an optimal level of functioning, and the patient will take all medications as prescribed.  The patient/guardian verbalized understanding and agreement with goals that were mutually set.    TREATMENT PLAN: Continue supportive psychotherapy efforts and medications as indicated for patient's diagnosis.  Pharmacological and Non-Pharmacological treatment options discussed during today's visit. Patient/Guardian acknowledged and verbally consented with current treatment plan and was educated on the importance of compliance with treatment and follow-up appointments.      MEDICATION ISSUES:  Discussed medication options and treatment plan of prescribed medication as well as the risks, benefits, any black box warnings, and side effects including potential falls, possible impaired driving, and metabolic adversities among others. Patient  is agreeable to call the office with any worsening of symptoms or onset of side effects, or if any concerns or questions arise.  The contact information for the office is made available to the patient. Patient is agreeable to call 911 or go to the nearest ER should they begin having any SI/HI, or if any urgent concerns arise. No medication side effects or related complaints today.     MEDS ORDERED DURING VISIT:  New Medications Ordered This Visit   Medications    amphetamine-dextroamphetamine XR (Adderall XR) 20 MG 24 hr capsule     Sig: Take 1 capsule by mouth Every Morning     Dispense:  30 capsule     Refill:  0       FOLLOW UP:  Return in about 6 weeks (around 2/1/2024) for Recheck.             This document has been electronically signed by CECILIA Walters  January 2, 2024 11:13 EST    Please note that portions of this note were completed with a voice recognition program. Efforts were made to edit dictation, but occasionally words are mistranscribed.

## 2024-01-21 DIAGNOSIS — F90.2 ADHD (ATTENTION DEFICIT HYPERACTIVITY DISORDER), COMBINED TYPE: ICD-10-CM

## 2024-01-21 RX ORDER — DEXTROAMPHETAMINE SACCHARATE, AMPHETAMINE ASPARTATE MONOHYDRATE, DEXTROAMPHETAMINE SULFATE AND AMPHETAMINE SULFATE 5; 5; 5; 5 MG/1; MG/1; MG/1; MG/1
20 CAPSULE, EXTENDED RELEASE ORAL EVERY MORNING
Qty: 30 CAPSULE | Refills: 0 | Status: CANCELLED | OUTPATIENT
Start: 2024-01-21

## 2024-01-22 RX ORDER — DEXTROAMPHETAMINE SACCHARATE, AMPHETAMINE ASPARTATE MONOHYDRATE, DEXTROAMPHETAMINE SULFATE AND AMPHETAMINE SULFATE 6.25; 6.25; 6.25; 6.25 MG/1; MG/1; MG/1; MG/1
25 CAPSULE, EXTENDED RELEASE ORAL DAILY
Qty: 30 CAPSULE | Refills: 0 | Status: SHIPPED | OUTPATIENT
Start: 2024-01-22

## 2024-02-01 ENCOUNTER — OFFICE VISIT (OUTPATIENT)
Dept: PSYCHIATRY | Facility: CLINIC | Age: 54
End: 2024-02-01
Payer: COMMERCIAL

## 2024-02-01 VITALS
OXYGEN SATURATION: 97 % | HEART RATE: 100 BPM | DIASTOLIC BLOOD PRESSURE: 86 MMHG | BODY MASS INDEX: 24.97 KG/M2 | HEIGHT: 70 IN | SYSTOLIC BLOOD PRESSURE: 130 MMHG | WEIGHT: 174.4 LBS

## 2024-02-01 DIAGNOSIS — F41.1 GENERALIZED ANXIETY DISORDER: ICD-10-CM

## 2024-02-01 DIAGNOSIS — F90.2 ADHD (ATTENTION DEFICIT HYPERACTIVITY DISORDER), COMBINED TYPE: Primary | ICD-10-CM

## 2024-02-06 ENCOUNTER — TELEPHONE (OUTPATIENT)
Dept: PSYCHIATRY | Facility: CLINIC | Age: 54
End: 2024-02-06
Payer: COMMERCIAL

## 2024-02-06 DIAGNOSIS — F90.2 ADHD (ATTENTION DEFICIT HYPERACTIVITY DISORDER), COMBINED TYPE: Primary | ICD-10-CM

## 2024-02-06 NOTE — TELEPHONE ENCOUNTER
Patient held the adderall over the weekend and did not notice a difference at all. Please advise on increase or alternative med. Took it today and still not noticing a difference.

## 2024-02-06 NOTE — TELEPHONE ENCOUNTER
Patient states he would try the increased dose. Please advise on the dose. Ok to leave a detailed message.

## 2024-02-07 RX ORDER — LISDEXAMFETAMINE DIMESYLATE CAPSULES 30 MG/1
30 CAPSULE ORAL EVERY MORNING
Qty: 30 CAPSULE | Refills: 0 | Status: SHIPPED | OUTPATIENT
Start: 2024-02-07

## 2024-02-07 NOTE — TELEPHONE ENCOUNTER
Spoke to Aruna, agreeable to stop Adderall XR due to ineffectiveness and start Vyvanse 30 mg daily. RX sent to pharmacy.

## 2024-02-13 ENCOUNTER — OFFICE VISIT (OUTPATIENT)
Dept: PSYCHIATRY | Facility: CLINIC | Age: 54
End: 2024-02-13
Payer: COMMERCIAL

## 2024-02-13 DIAGNOSIS — F41.1 GENERALIZED ANXIETY DISORDER: Primary | ICD-10-CM

## 2024-02-13 DIAGNOSIS — F33.1 MODERATE EPISODE OF RECURRENT MAJOR DEPRESSIVE DISORDER: ICD-10-CM

## 2024-02-13 PROCEDURE — 90837 PSYTX W PT 60 MINUTES: CPT | Performed by: COUNSELOR

## 2024-02-13 NOTE — PROGRESS NOTES
Date:2024   Patient Name: Aruna Hernandez  : 1970   MRN: [id number]   Time IN: 12:05 PM    Time OUT: 1:16 PM     Referring Provider: Amy Conrad MD    PROGRESS NOTE    History of Present Illness:   Aruna Hernandez is a 53 y.o. male who is being seen today for follow up individual Psychotherapy session.     Chief Complaint:  Pt struggles with depression, anxiety, has a trauma hx and was diagnosed at our clinic with ADHD combined type.  Pt is attended at our clinic for med mgt - since last May.  Pt has a therapist he see's for family issues outside of our clinic.  Pt is wanting to work on trauma hx.              ICD-10-CM ICD-9-CM   1. Generalized anxiety disorder  F41.1 300.02   2. Moderate episode of recurrent major depressive disorder  F33.1 296.32          Clinical Maneuvering/Intervention:   Assisted patient in processing above session content; acknowledged and normalized patient’s thoughts, feelings, and concerns to build appropriate rapport and a positive therapeutic relationship with open and honest communication.  Processed and rationalized patients thoughts and feelings regarding adverse life experiences/trauma, managing MH.  Discussed triggers associated with patient's anxiety/depression.  Also discussed coping skills for patient to implement such as processing details/thoughts/feelings related to trauma, resolving cognitive distortions from trauma, leaning on positive supports.    Allowed patient to freely discuss issues without interruption or judgment. Provided safe, confidential environment to facilitate the development of positive therapeutic relationship and encourage open, honest communication. Assisted patient in identifying risk factors which would indicate the need for higher level of care including thoughts to harm self or others and/or self-harming behavior and encouraged patient to contact this office, call 911, or present to the nearest emergency room should any of these  events occur. Discussed crisis intervention services and means to access. Patient adamantly and convincingly denies current suicidal or homicidal ideation or perceptual disturbance.    Mental Status Exam:   Hygiene:   good  Cooperation:  Cooperative  Eye Contact:  Good  Psychomotor Behavior:  Appropriate  Affect:  Appropriate  Mood: normal  Speech:  Normal  Thought Process:  Goal directed and Linear  Thought Content:  Normal  Suicidal:  None  Homicidal:  None  Hallucinations:  None  Delusion:  None  Memory:  Intact  Orientation:  Person, Place, Time, and Situation  Reliability:  good  Insight:  Good  Judgement:  Good  Impulse Control:  Good  Physical/Medical Issues:  No      Patient's Support Network Includes:   and extended family    Functional Status: Mild impairment     Progress toward goal: Not at goal    Prognosis: Good with Ongoing Treatment     Medications:     Current Outpatient Medications:     fenofibrate (FENOGLIDE) 40 MG tablet, , Disp: , Rfl:     fluticasone (FLONASE) 50 MCG/ACT nasal spray, , Disp: , Rfl:     Gabapentin Enacarbil ER (Horizant) 300 MG tablet controlled-release, , Disp: , Rfl:     latanoprost (XALATAN) 0.005 % ophthalmic solution, , Disp: , Rfl:     lisdexamfetamine (Vyvanse) 30 MG capsule, Take 1 capsule by mouth Every Morning, Disp: 30 capsule, Rfl: 0    Loratadine 10 MG capsule, , Disp: , Rfl:     montelukast (SINGULAIR) 10 MG tablet, , Disp: , Rfl:     omeprazole (priLOSEC) 20 MG capsule, , Disp: , Rfl:     PARoxetine (PAXIL) 40 MG tablet, Take 1 tablet by mouth Every Morning., Disp: , Rfl:     pravastatin (PRAVACHOL) 40 MG tablet, , Disp: , Rfl:     Visit Diagnosis/Orders Placed This Visit:    ICD-10-CM ICD-9-CM   1. Generalized anxiety disorder  F41.1 300.02   2. Moderate episode of recurrent major depressive disorder  F33.1 296.32        PLAN:  Safety: No acute safety concerns  Risk Assessment: Risk of self-harm acutely is low. Risk of self-harm chronically is also low, but  could be further elevated in the event of treatment noncompliance and/or AODA.    Crisis Plan:  Symptoms and/or behaviors to indicate a crisis: Excessive worry or fear and Feeling sad or low    What calming techniques or other strategies will patient use to de-escalate and stay safe: slow down, breathe, visualize calming self, think it though, listen to music, change focus, take a walk    Who is one person patient can contact to assist with de-escalation?     Treatment Plan/Goals: Patient will continue supportive psychotherapy efforts and medication regimen as prescribed. Therapist will provide Cognitive Behavioral Therapy to assist patient in improving functioning and gaining coping skills, maintaining stability, and avoiding decompensation and the need for higher level of care. Plan for treatment was discussed during today's visit. Patient acknowledged and verbally consented to continue with current treatment plan and was educated on the importance of compliance with treatment and follow-up appointments.     Patient will contact this office, call 911 or present to the nearest emergency room should suicidal or homicidal ideations occur.     Follow Up:   Return in about 2 weeks (around 2/27/2024) for Therapy session.      BETTINA Jeffery   [facility]     This document has been electronically signed by BETTINA Jeffery   February 19, 2024 09:35 EST

## 2024-03-13 DIAGNOSIS — F90.2 ADHD (ATTENTION DEFICIT HYPERACTIVITY DISORDER), COMBINED TYPE: ICD-10-CM

## 2024-03-13 RX ORDER — LISDEXAMFETAMINE DIMESYLATE CAPSULES 30 MG/1
30 CAPSULE ORAL EVERY MORNING
Qty: 30 CAPSULE | Refills: 0 | Status: SHIPPED | OUTPATIENT
Start: 2024-03-13

## 2024-03-28 ENCOUNTER — OFFICE VISIT (OUTPATIENT)
Dept: PSYCHIATRY | Facility: CLINIC | Age: 54
End: 2024-03-28
Payer: COMMERCIAL

## 2024-03-28 VITALS
HEIGHT: 70 IN | DIASTOLIC BLOOD PRESSURE: 86 MMHG | WEIGHT: 181 LBS | OXYGEN SATURATION: 98 % | SYSTOLIC BLOOD PRESSURE: 138 MMHG | BODY MASS INDEX: 25.91 KG/M2 | HEART RATE: 91 BPM

## 2024-03-28 DIAGNOSIS — F90.2 ADHD (ATTENTION DEFICIT HYPERACTIVITY DISORDER), COMBINED TYPE: Primary | ICD-10-CM

## 2024-03-28 DIAGNOSIS — F41.9 ANXIETY: ICD-10-CM

## 2024-03-28 RX ORDER — DEXTROAMPHETAMINE SACCHARATE, AMPHETAMINE ASPARTATE MONOHYDRATE, DEXTROAMPHETAMINE SULFATE, AMPHETAMINE SULFATE 3.125; 3.125; 3.125; 3.125 MG/1; MG/1; MG/1; MG/1
12.5 CAPSULE, EXTENDED RELEASE ORAL DAILY
Qty: 30 CAPSULE | Refills: 0 | Status: SHIPPED | OUTPATIENT
Start: 2024-03-28

## 2024-03-28 NOTE — PROGRESS NOTES
"Subjective   Aruna Hernandez is a 53 y.o. male who presents today for follow up    Chief Complaint:  Anxiety, depression and poor concentration     History of Present Illness:   History of Present Illness  Aruna Hernandez presents today for medication management follow-up.  Medication recently changed from Adderall XR to Vyvanse 30 mg daily.  Feels that medication change has been beneficial for overall ADHD symptoms.  He does voice concern about availability of medication due to difficulty obtaining prescription, was without medication x 1 week.  Says that he did not feel well being without medication, was fatigued and experienced body aches. Says that he feels better overall now, but does continue to struggle with some anxiety and depression.  Some of his symptoms he feels could likely be related to increased stressors at work.  Sleep is sometimes poor, has other history of RLS that decreases quality of sleep.  Says that appetite is sometimes poor because he \"forgets to eat.\"  Denies any weight loss.  Continues to see Dyll[facility] and says that they recently began EMDR for past trauma.  Denies any SI/HI.  PHQ-9 total score: 14, CARLOS EDUARDO-7 total Score: 13.    Previous Psych Meds: Zoloft (10 years), currently taking Paxil (x4 years), Wellbutrin (anger and irritability), Qelbree (ineffective)     The following portions of the patient's history were reviewed and updated as appropriate: allergies, current medications, past family history, past medical history, past social history, past surgical history and problem list.      Past Medical History:  Past Medical History:   Diagnosis Date    ADHD (attention deficit hyperactivity disorder) 2/28/2023    I have been seeing Dyllan Campbell a local therapist who brought it to my attention.  He did not diagnose, he could not do that.    Depression September 1993    Depressive disorder 03/20/2018       Social History:  Social History     Socioeconomic History    Marital " status:    Tobacco Use    Smoking status: Never     Passive exposure: Never    Smokeless tobacco: Never   Vaping Use    Vaping status: Never Used   Substance and Sexual Activity    Alcohol use: Not Currently     Comment: Binge drinker.  No longer drink.    Drug use: Never    Sexual activity: Yes     Partners: Male     Birth control/protection: Condom, Same-sex partner       Family History:  Family History   Problem Relation Age of Onset    Depression Mother         Off and on throughout life.    ADD / ADHD Father         Bio-Dad, never known him. Step dad raised me.    Alcohol abuse Father     Depression Father     Alcohol abuse Maternal Grandfather         Passed away 11/1986    Alcohol abuse Sister         Half sister    Depression Sister     Drug abuse Sister     Paranoid behavior Sister        Past Surgical History:  Past Surgical History:   Procedure Laterality Date    ABDOMINAL SURGERY  2018    Gallbladder removed    COLONOSCOPY  2022    ENDOSCOPY  2021    HERNIA REPAIR  1972    TONSILLECTOMY  2007       Problem List:  Patient Active Problem List   Diagnosis    Seasonal allergic rhinitis    Restless legs    Overweight    Nausea    Mild intermittent asthma    Inactive tuberculosis    Hyperlipidemia    Depressive disorder    Change in weight         Allergy:   Allergies   Allergen Reactions    Azithromycin Unknown - High Severity    Ibuprofen Unknown - High Severity        Current Medications:   Current Outpatient Medications   Medication Sig Dispense Refill    fenofibrate (FENOGLIDE) 40 MG tablet       fluticasone (FLONASE) 50 MCG/ACT nasal spray       Gabapentin Enacarbil ER (Horizant) 300 MG tablet controlled-release       latanoprost (XALATAN) 0.005 % ophthalmic solution       Loratadine 10 MG capsule       montelukast (SINGULAIR) 10 MG tablet       omeprazole (priLOSEC) 20 MG capsule       PARoxetine (PAXIL) 40 MG tablet Take 1 tablet by mouth Every Morning.      pravastatin (PRAVACHOL) 40 MG tablet   "     Amphet-Dextroamphet 3-Bead ER 12.5 MG capsule sustained-release 24 hr Take 12.5 mg by mouth Daily. 30 capsule 0     No current facility-administered medications for this visit.     Review of Systems   Constitutional:  Negative for activity change, appetite change, unexpected weight gain and unexpected weight loss.   Respiratory:  Negative for shortness of breath.    Cardiovascular:  Negative for chest pain.   Psychiatric/Behavioral:  Positive for decreased concentration, dysphoric mood, sleep disturbance and stress. Negative for suicidal ideas. The patient is nervous/anxious.      Physical Exam  Vitals reviewed.   Constitutional:       General: He is not in acute distress.     Appearance: Normal appearance.   Neurological:      Mental Status: He is alert.      Gait: Gait normal.     Vitals:   Blood pressure 138/86, pulse 91, height 177.8 cm (70\"), weight 82.1 kg (181 lb), SpO2 98%.    Mental Status Exam:   Hygiene:   good  Cooperation:  Cooperative  Eye Contact:  Good  Psychomotor Behavior:  Appropriate  Affect:  Full range and Appropriate  Mood: normal  Hopelessness: Denies  Speech:   Talkative  Thought Process:  Goal directed and Linear  Thought Content:  Mood congruent  Suicidal:  None  Homicidal:  None  Hallucinations:  None  Delusion:  None  Memory:  Intact  Orientation:  Person, Place, Time, and Situation  Reliability:  good  Insight:  Good  Judgement:  Good  Impulse Control:  Good    Assessment & Plan   Problems Addressed this Visit    None  Visit Diagnoses       ADHD (attention deficit hyperactivity disorder), combined type    -  Primary    Relevant Medications    Amphet-Dextroamphet 3-Bead ER 12.5 MG capsule sustained-release 24 hr    Anxiety              Diagnoses         Codes Comments    ADHD (attention deficit hyperactivity disorder), combined type    -  Primary ICD-10-CM: F90.2  ICD-9-CM: 314.01     Anxiety     ICD-10-CM: F41.9  ICD-9-CM: 300.00             Visit Diagnoses:    ICD-10-CM ICD-9-CM "   1. ADHD (attention deficit hyperactivity disorder), combined type  F90.2 314.01   2. Anxiety  F41.9 300.00     Aruna presents today for medication management follow-up.  Medication recently changed from Adderall XR to Vyvanse and feels that he has noticed better control of symptoms with new medication, but voices concern about availability.  Feels that overall mood has improved, does admit increased stressors with work that have likely contributed to anxiety and depression. Discussed medications and options, will stop Vyvanse due to availability and start Mydayis 12.5 mg daily for ADHD.  Agreeable to continue with Paxil 40 mg daily as prescribed by PCP.    -Start Mydayis 12.5 mg daily  -Stop Vyvanse 30 mg daily due to availability  -Continue Paxil 40 mg daily as prescribed by PCP.    -Reviewed previous available documentation and most recent available labs.  Compliance UDS on file from 10/26/2023.  AMAURI reviewed and is appropriate.  Counseled on the use of controlled substances.  Signed controlled substance contract agreement on file.    GOALS:  Short Term Goals: Patient will be compliant with medication, and patient will have no significant medication related side effects.  Patient will be engaged in psychotherapy as indicated.  Patient will report subjective improvement of symptoms.  Long term goals: To stabilize mood and treat/improve subjective symptoms, the patient will stay out of the hospital, the patient will be at an optimal level of functioning, and the patient will take all medications as prescribed.  The patient/guardian verbalized understanding and agreement with goals that were mutually set.    TREATMENT PLAN: Continue supportive psychotherapy efforts and medications as indicated for patient's diagnosis.  Pharmacological and Non-Pharmacological treatment options discussed during today's visit. Patient/Guardian acknowledged and verbally consented with current treatment plan and was educated on  the importance of compliance with treatment and follow-up appointments.      MEDICATION ISSUES:  Discussed medication options and treatment plan of prescribed medication as well as the risks, benefits, any black box warnings, and side effects including potential falls, possible impaired driving, and metabolic adversities among others. Patient is agreeable to call the office with any worsening of symptoms or onset of side effects, or if any concerns or questions arise.  The contact information for the office is made available to the patient. Patient is agreeable to call 911 or go to the nearest ER should they begin having any SI/HI, or if any urgent concerns arise. No medication side effects or related complaints today.     MEDS ORDERED DURING VISIT:  New Medications Ordered This Visit   Medications    Amphet-Dextroamphet 3-Bead ER 12.5 MG capsule sustained-release 24 hr     Sig: Take 12.5 mg by mouth Daily.     Dispense:  30 capsule     Refill:  0       FOLLOW UP:  Return in about 8 weeks (around 5/23/2024).             This document has been electronically signed by CECILIA Walters  April 5, 2024 14:39 EDT    Please note that portions of this note were completed with a voice recognition program. Efforts were made to edit dictation, but occasionally words are mistranscribed.

## 2024-04-28 DIAGNOSIS — F90.2 ADHD (ATTENTION DEFICIT HYPERACTIVITY DISORDER), COMBINED TYPE: ICD-10-CM

## 2024-04-28 RX ORDER — DEXTROAMPHETAMINE SACCHARATE, AMPHETAMINE ASPARTATE MONOHYDRATE, DEXTROAMPHETAMINE SULFATE, AMPHETAMINE SULFATE 3.125; 3.125; 3.125; 3.125 MG/1; MG/1; MG/1; MG/1
12.5 CAPSULE, EXTENDED RELEASE ORAL DAILY
Qty: 30 CAPSULE | Refills: 0 | Status: CANCELLED | OUTPATIENT
Start: 2024-04-28

## 2024-04-29 RX ORDER — DEXTROAMPHETAMINE SACCHARATE, AMPHETAMINE ASPARTATE MONOHYDRATE, DEXTROAMPHETAMINE SULFATE, AMPHETAMINE SULFATE 6.25; 6.25; 6.25; 6.25 MG/1; MG/1; MG/1; MG/1
25 CAPSULE, EXTENDED RELEASE ORAL DAILY
Qty: 30 CAPSULE | Refills: 0 | Status: SHIPPED | OUTPATIENT
Start: 2024-04-29

## 2024-05-23 ENCOUNTER — OFFICE VISIT (OUTPATIENT)
Dept: PSYCHIATRY | Facility: CLINIC | Age: 54
End: 2024-05-23
Payer: COMMERCIAL

## 2024-05-23 VITALS
HEART RATE: 104 BPM | DIASTOLIC BLOOD PRESSURE: 88 MMHG | BODY MASS INDEX: 25.34 KG/M2 | OXYGEN SATURATION: 98 % | HEIGHT: 70 IN | SYSTOLIC BLOOD PRESSURE: 148 MMHG | WEIGHT: 177 LBS

## 2024-05-23 DIAGNOSIS — F41.9 ANXIETY: Primary | ICD-10-CM

## 2024-05-23 DIAGNOSIS — F33.1 MODERATE EPISODE OF RECURRENT MAJOR DEPRESSIVE DISORDER: ICD-10-CM

## 2024-05-23 RX ORDER — VILOXAZINE HYDROCHLORIDE 200 MG/1
1 CAPSULE, EXTENDED RELEASE ORAL DAILY
COMMUNITY
End: 2024-05-23

## 2024-05-23 NOTE — PROGRESS NOTES
"Subjective   Aruna Hernandez is a 53 y.o. male who presents today for follow up    Chief Complaint:  Anxiety, depression and poor concentration     History of Present Illness:   History of Present Illness  Aruna Hernandez presents today for medication management follow-up. Currently taking Paxil 40 mg and Mydayis 25 mg daily.  Feels that he has noticed no changes in his overall mood, says that \"nothing is different.\"  Continues to stay busy with work, school and kitchen renovations.  Struggling with recent incident involving his therapist that has resulted in feeling that sessions are no longer therapeutic.  Has not been to therapy in about 3 weeks. Overall mood has been low and feeling down. Anxiety has increased, feeling overwhelmed, restless and excessive worry. Feels that sleep is \"ok,\" typically sleeps around 8 hours per night. Appetite has been decreased, sometimes forgets to eat. Denies current SI/HI.  PHQ-9 total Score: 21, CARLOS EDUARDO-7 total Score: 17.    Previous Psych Meds: Zoloft (10 years), currently taking Paxil (x4 years), Wellbutrin (anger and irritability), Qelbree (ineffective), Adderall XR, Vyvanse, Mydayis  Depression  Presents for follow-up visit. Symptoms include decreased concentration, excessive worry, insomnia, irritability, nervousness/anxiety, obsessions, malaise/fatigue, difficulty controlling mood, difficulty staying asleep and difficulty falling asleep. Patient is not experiencing: confusion, hypersomnia, shortness of breath, suicidal ideas, weight gain, weight loss, chest pain and dizziness.Symptoms occur most days.  The severity of symptoms is moderate.  The symptoms are aggravated by nothing. The patient sleeps 6 hours per night. His past medical history is significant for depression. Compliance with medications is %.      The following portions of the patient's history were reviewed and updated as appropriate: allergies, current medications, past family history, past medical history, " past social history, past surgical history and problem list.      Past Medical History:  Past Medical History:   Diagnosis Date    ADHD (attention deficit hyperactivity disorder) 2/28/2023    I have been seeing Dyllan Campbell a local therapist who brought it to my attention.  He did not diagnose, he could not do that.    Depression September 1993    Depressive disorder 03/20/2018       Social History:  Social History     Socioeconomic History    Marital status:    Tobacco Use    Smoking status: Never     Passive exposure: Never    Smokeless tobacco: Never   Vaping Use    Vaping status: Never Used   Substance and Sexual Activity    Alcohol use: Not Currently     Comment: Binge drinker.  No longer drink.    Drug use: Never    Sexual activity: Yes     Partners: Male     Birth control/protection: Condom, Same-sex partner       Family History:  Family History   Problem Relation Age of Onset    Depression Mother         Off and on throughout life.    ADD / ADHD Father         Bio-Dad, never known him. Step dad raised me.    Alcohol abuse Father     Depression Father     Alcohol abuse Maternal Grandfather         Passed away 11/1986    Alcohol abuse Sister         Half sister    Depression Sister     Drug abuse Sister     Paranoid behavior Sister      Past Surgical History:  Past Surgical History:   Procedure Laterality Date    ABDOMINAL SURGERY  2018    Gallbladder removed    COLONOSCOPY  2022    ENDOSCOPY  2021    HERNIA REPAIR  1972    TONSILLECTOMY  2007     Problem List:  Patient Active Problem List   Diagnosis    Seasonal allergic rhinitis    Restless legs    Overweight    Nausea    Mild intermittent asthma    Inactive tuberculosis    Hyperlipidemia    Depressive disorder    Change in weight     Allergy:   Allergies   Allergen Reactions    Azithromycin Unknown - High Severity    Ibuprofen Unknown - High Severity      Current Medications:   Current Outpatient Medications   Medication Sig Dispense Refill     "Amphet-Dextroamphet 3-Bead ER 25 MG capsule sustained-release 24 hr Take 1 capsule by mouth Daily. 30 capsule 0    fenofibrate (FENOGLIDE) 40 MG tablet       fluticasone (FLONASE) 50 MCG/ACT nasal spray       Gabapentin Enacarbil ER (Horizant) 300 MG tablet controlled-release       latanoprost (XALATAN) 0.005 % ophthalmic solution       Loratadine 10 MG capsule       montelukast (SINGULAIR) 10 MG tablet       omeprazole (priLOSEC) 20 MG capsule       PARoxetine (PAXIL) 40 MG tablet Take 1 tablet by mouth Every Morning.      pravastatin (PRAVACHOL) 40 MG tablet        No current facility-administered medications for this visit.     Review of Systems   Constitutional:  Positive for irritability and malaise/fatigue. Negative for activity change, appetite change, unexpected weight gain and unexpected weight loss.   Respiratory:  Negative for shortness of breath.    Cardiovascular:  Negative for chest pain.   Neurological:  Negative for dizziness and confusion.   Psychiatric/Behavioral:  Positive for decreased concentration, dysphoric mood, sleep disturbance and stress. Negative for suicidal ideas. The patient is nervous/anxious and has insomnia.      Physical Exam  Vitals reviewed.   Constitutional:       General: He is not in acute distress.     Appearance: Normal appearance.   Neurological:      Mental Status: He is alert.      Gait: Gait normal.     Vitals:   Blood pressure 148/88, pulse 104, height 177.8 cm (70\"), weight 80.3 kg (177 lb), SpO2 98%.    Mental Status Exam:   Hygiene:   good  Cooperation:  Cooperative  Eye Contact:  Good  Psychomotor Behavior:  Appropriate  Affect:  Full range and Appropriate  Mood: sad and anxious  Hopelessness: Denies  Speech:   Talkative  Thought Process:  Goal directed and Linear  Thought Content:  Mood congruent  Suicidal:  None  Homicidal:  None  Hallucinations:  None  Delusion:  None  Memory:  Intact  Orientation:  Person, Place, Time, and Situation  Reliability:  good  Insight: "  Good  Judgement:  Good  Impulse Control:  Good    Assessment & Plan   Problems Addressed this Visit    None  Visit Diagnoses       Anxiety    -  Primary    Moderate episode of recurrent major depressive disorder              Diagnoses         Codes Comments    Anxiety    -  Primary ICD-10-CM: F41.9  ICD-9-CM: 300.00     Moderate episode of recurrent major depressive disorder     ICD-10-CM: F33.1  ICD-9-CM: 296.32             Visit Diagnoses:    ICD-10-CM ICD-9-CM   1. Anxiety  F41.9 300.00   2. Moderate episode of recurrent major depressive disorder  F33.1 296.32     Aruna presents today for medication management follow up. He is having increased symptoms of depression and anxiety. Some of his symptoms are likely related to recent stressors. Discussed plan and medication regimen/options. He is agreeable to schedule appointment with therapy here for next available. Will obtain GeneSight today then consider changing Paxil to different SSRI or SNRI. Also discussed possible discontinue of Mydayis as he is unsure of benefit medication provides.    -GeneSight today  -Continue Mydayis 25 mg daily  -Continue Paxil 40 mg daily as prescribed by PCP.    -Reviewed previous available documentation and most recent available labs.  Compliance UDS on file from 10/26/2023.  AMAURI reviewed and is appropriate.  Counseled on the use of controlled substances. Signed controlled substance contract agreement on file.    GOALS:  Short Term Goals: Patient will be compliant with medication, and patient will have no significant medication related side effects.  Patient will be engaged in psychotherapy as indicated.  Patient will report subjective improvement of symptoms.  Long term goals: To stabilize mood and treat/improve subjective symptoms, the patient will stay out of the hospital, the patient will be at an optimal level of functioning, and the patient will take all medications as prescribed.  The patient/guardian verbalized  understanding and agreement with goals that were mutually set.    TREATMENT PLAN: Continue supportive psychotherapy efforts and medications as indicated for patient's diagnosis.  Pharmacological and Non-Pharmacological treatment options discussed during today's visit. Patient/Guardian acknowledged and verbally consented with current treatment plan and was educated on the importance of compliance with treatment and follow-up appointments.      MEDICATION ISSUES:  Discussed medication options and treatment plan of prescribed medication as well as the risks, benefits, any black box warnings, and side effects including potential falls, possible impaired driving, and metabolic adversities among others. Patient is agreeable to call the office with any worsening of symptoms or onset of side effects, or if any concerns or questions arise.  The contact information for the office is made available to the patient. Patient is agreeable to call 911 or go to the nearest ER should they begin having any SI/HI, or if any urgent concerns arise. No medication side effects or related complaints today.     MEDS ORDERED DURING VISIT:  No orders of the defined types were placed in this encounter.      FOLLOW UP:  Return in about 4 weeks (around 6/20/2024) for Recheck.             This document has been electronically signed by CECILIA Walters  June 6, 2024 23:06 EDT    Please note that portions of this note were completed with a voice recognition program. Efforts were made to edit dictation, but occasionally words are mistranscribed.

## 2024-05-29 ENCOUNTER — OFFICE VISIT (OUTPATIENT)
Dept: PSYCHIATRY | Facility: CLINIC | Age: 54
End: 2024-05-29
Payer: COMMERCIAL

## 2024-05-29 DIAGNOSIS — F41.1 GENERALIZED ANXIETY DISORDER: Primary | ICD-10-CM

## 2024-05-29 PROCEDURE — 90837 PSYTX W PT 60 MINUTES: CPT | Performed by: COUNSELOR

## 2024-05-29 NOTE — PROGRESS NOTES
Date:2024   Patient Name: Aruna Hernandez  : 1970   MRN: [id number]   Time IN: 2:30    Time OUT: 3:28     Referring Provider: Amy Conrad MD    PROGRESS NOTE    History of Present Illness:   Aruna Hernandez is a 53 y.o. male who is being seen today for follow up individual Psychotherapy session.     Chief Complaint:  Patient arrived at the [facility].  Patient expressed some struggles with excessive worry, overwhelmed, restlessness, focusing, feeling heard by other, feeling down more than usual, nightmares, flashbacks, and isolation.  Patient identified some traumatic experiences that he is experienced that his life and would like to better understand the diagnostics of mental health to make sure he better understand some self.  Patient also reported that he would like to practice EMDR therapy to help him and reprocessing events that has happened in his life.    ICD-10-CM ICD-9-CM   1. Generalized anxiety disorder  F41.1 300.02        Clinical Maneuvering/Intervention: EMDR phase 1 thematic history taking and treatment planning to assist with    Assisted patient in processing above session content; acknowledged and normalized patient’s thoughts, feelings, and concerns to build appropriate rapport and a positive therapeutic relationship with open and honest communication.  Rationalized patient thought process regarding CARLOS EDUARDO.  Discussed triggers associated with patient's CARLOS EDUARDO.  Also discussed coping skills for patient to implement such as mindful breathing and journaling.    Allowed patient to freely discuss issues without interruption or judgment. Provided safe, confidential environment to facilitate the development of positive therapeutic relationship and encourage open, honest communication. Assisted patient in identifying risk factors which would indicate the need for higher level of care including thoughts to harm self or others and/or self-harming behavior and encouraged patient to  contact this office, call 911, or present to the nearest emergency room should any of these events occur. Discussed crisis intervention services and means to access. Patient adamantly and convincingly denies current suicidal or homicidal ideation or perceptual disturbance.    Assessment Scores:   PHQ-9 Total Score:     CARLOS EDUARDO-7 Score:    PTSD Total Score: .PTSDTOTALSCORE    (Scales based on 0 - 10 with 10 being the worst)  Depression: 5 Anxiety 5       Mental Status Exam:   Hygiene:   good  Cooperation:  Cooperative  Eye Contact:  Good  Psychomotor Behavior:  Appropriate  Affect:  Appropriate  Mood: normal  Speech:  Normal  Thought Process:  Linear  Thought Content:  Mood congruent  Suicidal:  None  Homicidal:  None  Hallucinations:  None  Delusion:  None  Memory:  Intact  Orientation:  Person, Place, Time, and Situation  Reliability:  good  Insight:  Good  Judgement:  Good  Impulse Control:  Fair  Physical/Medical Issues:  No      Patient's Support Network Includes:      Functional Status: Moderate impairment     Progress toward goal: At goal    Prognosis: Good with Ongoing Treatment     Medications:     Current Outpatient Medications:     Amphet-Dextroamphet 3-Bead ER 25 MG capsule sustained-release 24 hr, Take 1 capsule by mouth Daily., Disp: 30 capsule, Rfl: 0    fenofibrate (FENOGLIDE) 40 MG tablet, , Disp: , Rfl:     fluticasone (FLONASE) 50 MCG/ACT nasal spray, , Disp: , Rfl:     Gabapentin Enacarbil ER (Horizant) 300 MG tablet controlled-release, , Disp: , Rfl:     latanoprost (XALATAN) 0.005 % ophthalmic solution, , Disp: , Rfl:     Loratadine 10 MG capsule, , Disp: , Rfl:     montelukast (SINGULAIR) 10 MG tablet, , Disp: , Rfl:     omeprazole (priLOSEC) 20 MG capsule, , Disp: , Rfl:     PARoxetine (PAXIL) 40 MG tablet, Take 1 tablet by mouth Every Morning., Disp: , Rfl:     pravastatin (PRAVACHOL) 40 MG tablet, , Disp: , Rfl:     Visit Diagnosis/Orders Placed This Visit:    ICD-10-CM ICD-9-CM   1.  Generalized anxiety disorder  F41.1 300.02        PLAN:  Safety: No acute safety concerns  Risk Assessment: Risk of self-harm acutely is low. Risk of self-harm chronically is also low, but could be further elevated in the event of treatment noncompliance and/or AODA.    Crisis Plan:  Symptoms and/or behaviors to indicate a crisis: Excessive worry or fear, Feeling sad or low, and Prolonged irritability or anger    What calming techniques or other strategies will patient use to de-escalate and stay safe: slow down, breathe, visualize calming self, think it though, listen to music, change focus, take a walk    Who is one person patient can contact to assist with de-escalation?      Treatment Plan/Goals: Patient will continue supportive psychotherapy efforts and medication regimen as prescribed. Therapist will provide Cognitive Behavioral Therapy to assist patient in improving functioning and gaining coping skills, maintaining stability, and avoiding decompensation and the need for higher level of care. Plan for treatment was discussed during today's visit. Patient acknowledged and verbally consented to continue with current treatment plan and was educated on the importance of compliance with treatment and follow-up appointments.     Patient will contact this office, call 911 or present to the nearest emergency room should suicidal or homicidal ideations occur.     Follow Up:   No follow-ups on file.      BETTINA Rubio   [facility]     This document has been electronically signed by BETTINA Rubio   May 29, 2024 15:31 EDT

## 2024-06-20 ENCOUNTER — OFFICE VISIT (OUTPATIENT)
Dept: PSYCHIATRY | Facility: CLINIC | Age: 54
End: 2024-06-20
Payer: COMMERCIAL

## 2024-06-20 VITALS
BODY MASS INDEX: 26.66 KG/M2 | WEIGHT: 186.2 LBS | HEIGHT: 70 IN | DIASTOLIC BLOOD PRESSURE: 86 MMHG | OXYGEN SATURATION: 97 % | HEART RATE: 92 BPM | SYSTOLIC BLOOD PRESSURE: 140 MMHG

## 2024-06-20 DIAGNOSIS — F41.1 GENERALIZED ANXIETY DISORDER: ICD-10-CM

## 2024-06-20 DIAGNOSIS — F33.1 MODERATE EPISODE OF RECURRENT MAJOR DEPRESSIVE DISORDER: Primary | ICD-10-CM

## 2024-06-20 RX ORDER — DESVENLAFAXINE 25 MG/1
25 TABLET, EXTENDED RELEASE ORAL DAILY
Qty: 90 TABLET | Refills: 0 | Status: SHIPPED | OUTPATIENT
Start: 2024-06-20

## 2024-06-20 RX ORDER — PAROXETINE HYDROCHLORIDE 40 MG/1
TABLET, FILM COATED ORAL
Start: 2024-06-20

## 2024-06-20 NOTE — PROGRESS NOTES
"Subjective   Aruna Hernandez is a 53 y.o. male who presents today for follow up    Chief Complaint:  Anxiety, depression and poor concentration     History of Present Illness:   History of Present Illness  Aruna Hernandez presents today for medication management follow-up. Since last visit, says that he has stopped taking Mydayis as he did not feel medication was effective. Also stopped fenofibrate that he was taking for elevated triglycerides about 2 weeks ago.  Continues to experience symptoms of anxiety and depression.  Feels that he is in a constant loop of sadness with symptoms that are persistent, but change in severity.  Symptoms of depression include feeling down, decreased motivation and very little interest in things.  Says that this cycle of symptoms has been mentally exhausting.  Feels that he is still \"thomas\" at times.  Says that irritability can occur without any known triggers.  Becomes easily irritable, overwhelmed and feels a constant sense of running behind.  Rates overall anxiety level 10/10, says that depression can be between 6-8/10 on a 0-10 scale with 10 being the worst.  Sleep tends to vary. Appetite has been decreased.  Denies any SI/HI. PHQ-9 total score: 15, CARLOS EDUARDO-7 total score: 14.    Previous Psych Meds: Zoloft (10 years), currently taking Paxil (x4 years), Wellbutrin (anger and irritability), Qelbree (ineffective), Adderall XR, Vyvanse, Mydayis  Anxiety  Presents for follow-up visit.  Symptoms include decreased concentration, depressed mood, excessive worry, insomnia, irritability, nervous/anxious behavior, obsessions and malaise/fatigue.  Patient reports no chest pain, dry mouth, palpitations, shortness of breath or suicidal ideas. Symptoms occur constantly. The severity of symptoms is moderate. The symptoms are aggravated by family issues, social activities and work stress. The patient sleeps 6 hours per night. The quality of sleep is poor. His past medical history is significant for " depression. Compliance with medications is %.      The following portions of the patient's history were reviewed and updated as appropriate: allergies, current medications, past family history, past medical history, past social history, past surgical history and problem list.      Past Medical History:  Past Medical History:   Diagnosis Date    ADHD (attention deficit hyperactivity disorder) 2/28/2023    I have been seeing Dyllan Campbell a local therapist who brought it to my attention.  He did not diagnose, he could not do that.    Depression September 1993    Depressive disorder 03/20/2018       Social History:  Social History     Socioeconomic History    Marital status:    Tobacco Use    Smoking status: Never     Passive exposure: Never    Smokeless tobacco: Never   Vaping Use    Vaping status: Never Used   Substance and Sexual Activity    Alcohol use: Not Currently     Comment: Binge drinker.  No longer drink.    Drug use: Never    Sexual activity: Yes     Partners: Male     Birth control/protection: Condom, Same-sex partner       Family History:  Family History   Problem Relation Age of Onset    Depression Mother         Off and on throughout life.    ADD / ADHD Father         Bio-Dad, never known him. Step dad raised me.    Alcohol abuse Father     Depression Father     Alcohol abuse Maternal Grandfather         Passed away 11/1986    Alcohol abuse Sister         Half sister    Depression Sister     Drug abuse Sister     Paranoid behavior Sister      Past Surgical History:  Past Surgical History:   Procedure Laterality Date    ABDOMINAL SURGERY  2018    Gallbladder removed    COLONOSCOPY  2022    ENDOSCOPY  2021    HERNIA REPAIR  1972    TONSILLECTOMY  2007     Problem List:  Patient Active Problem List   Diagnosis    Seasonal allergic rhinitis    Restless legs    Overweight    Nausea    Mild intermittent asthma    Inactive tuberculosis    Hyperlipidemia    Depressive disorder    Change in  "weight     Allergy:   Allergies   Allergen Reactions    Azithromycin Unknown - High Severity    Ibuprofen Unknown - High Severity      Current Medications:   Current Outpatient Medications   Medication Sig Dispense Refill    fluticasone (FLONASE) 50 MCG/ACT nasal spray       Gabapentin Enacarbil ER (Horizant) 300 MG tablet controlled-release       latanoprost (XALATAN) 0.005 % ophthalmic solution       Loratadine 10 MG capsule       montelukast (SINGULAIR) 10 MG tablet       omeprazole (priLOSEC) 20 MG capsule       PARoxetine (PAXIL) 40 MG tablet Decrease to 30 mg daily x 7 days, then 20 mg x 7 days then 10 mg x 7 days then stop      pravastatin (PRAVACHOL) 40 MG tablet       Desvenlafaxine Succinate ER (Pristiq) 25 MG tablet sustained-release 24 hour Take 1 tablet by mouth Daily. 90 tablet 0     No current facility-administered medications for this visit.     Review of Systems   Constitutional:  Positive for irritability and malaise/fatigue. Negative for activity change, appetite change, unexpected weight gain and unexpected weight loss.   Respiratory:  Negative for shortness of breath.    Cardiovascular:  Negative for chest pain and palpitations.   Psychiatric/Behavioral:  Positive for decreased concentration, sleep disturbance, depressed mood and stress. Negative for suicidal ideas. The patient is nervous/anxious and has insomnia.      Physical Exam  Vitals reviewed.   Constitutional:       General: He is not in acute distress.     Appearance: Normal appearance.   Neurological:      Mental Status: He is alert.      Gait: Gait normal.     Vitals:   Blood pressure 140/86, pulse 92, height 177.8 cm (70\"), weight 84.5 kg (186 lb 3.2 oz), SpO2 97%.    Mental Status Exam:   Hygiene:   good  Cooperation:  Cooperative  Eye Contact:  Good  Psychomotor Behavior:  Appropriate  Affect:  Full range and Appropriate  Mood: normal  Hopelessness: Denies  Speech:   Talkative  Thought Process:  Goal directed and Linear  Thought " Content:  Mood congruent  Suicidal:  None  Homicidal:  None  Hallucinations:  None  Delusion:  None  Memory:  Intact  Orientation:  Person, Place, Time, and Situation  Reliability:  good  Insight:  Good  Judgement:  Good  Impulse Control:  Good    Assessment & Plan   Problems Addressed this Visit    None  Visit Diagnoses       Moderate episode of recurrent major depressive disorder    -  Primary    Relevant Medications    PARoxetine (PAXIL) 40 MG tablet    Desvenlafaxine Succinate ER (Pristiq) 25 MG tablet sustained-release 24 hour    Generalized anxiety disorder        Relevant Medications    PARoxetine (PAXIL) 40 MG tablet    Desvenlafaxine Succinate ER (Pristiq) 25 MG tablet sustained-release 24 hour          Diagnoses         Codes Comments    Moderate episode of recurrent major depressive disorder    -  Primary ICD-10-CM: F33.1  ICD-9-CM: 296.32     Generalized anxiety disorder     ICD-10-CM: F41.1  ICD-9-CM: 300.02             Visit Diagnoses:    ICD-10-CM ICD-9-CM   1. Moderate episode of recurrent major depressive disorder  F33.1 296.32   2. Generalized anxiety disorder  F41.1 300.02       Aruna presents today for medication management follow up. He has stopped stimulant medication along with fenofibrate. Still having mood fluctuations and anxiety. LED Light Senseignt results reviewed and discussed. He voices interest in changing medication regimen as he has been on Paxil for several years and no longer feels medication is beneficial. Discussed plan of care and medication options. Agreeable to taper and discontinue Paxil and start Pristiq 25 mg. Will consider increasing Pristiq to 50 mg after taper. Discussed taper schedule in detail and provided written instructions for decreasing dose each day on monthly calendar. Will reevaluate taper schedule if he experiences withdraw symptoms. Encouraged to continue with counseling[facility]    -Taper/discontinue Paxil   Decrease Paxil from 40 mg daily to 30  mg daily x 1 week, then decrease to 20 mg daily x 1 week, then decreased to 10 mg daily x 1 week, then stop  -Start Pristiq 25 mg daily     -Reviewed previous available documentation and most recent available labs. AMAURI reviewed and is appropriate.    GOALS:  Short Term Goals: Patient will be compliant with medication, and patient will have no significant medication related side effects.  Patient will be engaged in psychotherapy as indicated.  Patient will report subjective improvement of symptoms.  Long term goals: To stabilize mood and treat/improve subjective symptoms, the patient will stay out of the hospital, the patient will be at an optimal level of functioning, and the patient will take all medications as prescribed.  The patient/guardian verbalized understanding and agreement with goals that were mutually set.    TREATMENT PLAN: Continue supportive psychotherapy efforts and medications as indicated for patient's diagnosis.  Pharmacological and Non-Pharmacological treatment options discussed during today's visit. Patient/Guardian acknowledged and verbally consented with current treatment plan and was educated on the importance of compliance with treatment and follow-up appointments.      MEDICATION ISSUES:  Discussed medication options and treatment plan of prescribed medication as well as the risks, benefits, any black box warnings, and side effects including potential falls, possible impaired driving, and metabolic adversities among others. Patient is agreeable to call the office with any worsening of symptoms or onset of side effects, or if any concerns or questions arise.  The contact information for the office is made available to the patient. Patient is agreeable to call 911 or go to the nearest ER should they begin having any SI/HI, or if any urgent concerns arise. No medication side effects or related complaints today.     MEDS ORDERED DURING VISIT:  New Medications Ordered This Visit   Medications     PARoxetine (PAXIL) 40 MG tablet     Sig: Decrease to 30 mg daily x 7 days, then 20 mg x 7 days then 10 mg x 7 days then stop    Desvenlafaxine Succinate ER (Pristiq) 25 MG tablet sustained-release 24 hour     Sig: Take 1 tablet by mouth Daily.     Dispense:  90 tablet     Refill:  0       FOLLOW UP:  Return in about 4 weeks (around 7/18/2024) for Recheck.             This document has been electronically signed by CECILIA Walters  July 4, 2024 16:52 EDT    Please note that portions of this note were completed with a voice recognition program. Efforts were made to edit dictation, but occasionally words are mistranscribed.

## 2024-08-01 ENCOUNTER — OFFICE VISIT (OUTPATIENT)
Dept: PSYCHIATRY | Facility: CLINIC | Age: 54
End: 2024-08-01
Payer: COMMERCIAL

## 2024-08-01 DIAGNOSIS — F41.1 GENERALIZED ANXIETY DISORDER: ICD-10-CM

## 2024-08-01 DIAGNOSIS — F33.1 MODERATE EPISODE OF RECURRENT MAJOR DEPRESSIVE DISORDER: Primary | ICD-10-CM

## 2024-08-01 PROCEDURE — 90837 PSYTX W PT 60 MINUTES: CPT | Performed by: COUNSELOR

## 2024-08-01 NOTE — PROGRESS NOTES
Date:2024   Patient Name: Aruna Hernandez  : 1970   MRN: [id number]   Time IN: 8:49    Time OUT: 9:52     Referring Provider: Amy Conrad MD    PROGRESS NOTE    History of Present Illness:   Aruna Hernandez is a 54 y.o. male who is being seen today for follow up individual Psychotherapy session.     Chief Complaint:   Patient arrived at the [facility].  Patient expressed some struggles with excessive worry, overwhelmed, restlessness, focusing, feeling heard by other, feeling down more than usual, nightmares, flashbacks, and isolation.  Patient identified some self defense mechanisms and created a smart goal to assist him in taking action steps to giving up control.    ICD-10-CM ICD-9-CM   1. Moderate episode of recurrent major depressive disorder  F33.1 296.32   2. Generalized anxiety disorder  F41.1 300.02        Clinical Maneuvering/Intervention: EMDR to assist with Major depressive disorder and Generalized anxiety disorder    Phases utilized of EMDR: Phase 2 Preparation: client stabilization, resource building, EMDR therapy orientation, addressing client's concerns, addressing client's questions, oriented to bilateral forms of dual stimuli (MCKEON) and technical set up, evaluating appropriateness going further. Assisted patient in processing above session content; acknowledged and normalized patient’s thoughts, feelings, and concerns to build appropriate rapport and a positive therapeutic relationship with open and honest communication.  Rationalized patient thought process regarding Major depressive disorder and Generalized anxiety disorder.  Discussed triggers associated with patient's Major depressive disorder and generalized anxiety.  Also discussed coping skills for patient to implement such as Mindful breathing, walking away, setting boundaries.    Allowed patient to freely discuss issues without interruption or judgment. Provided safe, confidential environment to facilitate the  development of positive therapeutic relationship and encourage open, honest communication. Assisted patient in identifying risk factors which would indicate the need for higher level of care including thoughts to harm self or others and/or self-harming behavior and encouraged patient to contact this office, call 911, or present to the nearest emergency room should any of these events occur. Discussed crisis intervention services and means to access. Patient adamantly and convincingly denies current suicidal or homicidal ideation or perceptual disturbance.    Assessment Scores:   PHQ-9 Total Score:     CARLOS EDUARDO-7 Score:    PTSD Total Score: .PTSDTOTALSCORE    (Scales based on 0 - 10 with 10 being the worst)  Depression: 5 Anxiety 5       Mental Status Exam:   Hygiene:   good  Cooperation:  Cooperative  Eye Contact:  Good  Psychomotor Behavior:  Restless  Affect:  Appropriate  Mood: normal  Speech:  Normal  Thought Process:  Linear  Thought Content:  Mood congruent  Suicidal:  None  Homicidal:  None  Hallucinations:  None  Delusion:  None  Memory:  Intact  Orientation:  Person, Place, Time, and Situation  Reliability:  good  Insight:  Good  Judgement:  Good  Impulse Control:  Good  Physical/Medical Issues:  No      Patient's Support Network Includes:      Functional Status: Moderate impairment     Progress toward goal: At goal    Prognosis: Good with Ongoing Treatment     Medications:     Current Outpatient Medications:     Desvenlafaxine Succinate ER (Pristiq) 25 MG tablet sustained-release 24 hour, Take 1 tablet by mouth Daily., Disp: 90 tablet, Rfl: 0    fluticasone (FLONASE) 50 MCG/ACT nasal spray, , Disp: , Rfl:     Gabapentin Enacarbil ER (Horizant) 300 MG tablet controlled-release, , Disp: , Rfl:     latanoprost (XALATAN) 0.005 % ophthalmic solution, , Disp: , Rfl:     Loratadine 10 MG capsule, , Disp: , Rfl:     montelukast (SINGULAIR) 10 MG tablet, , Disp: , Rfl:     omeprazole (priLOSEC) 20 MG capsule, ,  Disp: , Rfl:     PARoxetine (PAXIL) 40 MG tablet, Decrease to 30 mg daily x 7 days, then 20 mg x 7 days then 10 mg x 7 days then stop, Disp: , Rfl:     pravastatin (PRAVACHOL) 40 MG tablet, , Disp: , Rfl:     Visit Diagnosis/Orders Placed This Visit:    ICD-10-CM ICD-9-CM   1. Moderate episode of recurrent major depressive disorder  F33.1 296.32   2. Generalized anxiety disorder  F41.1 300.02        PLAN:  Safety: No acute safety concerns  Risk Assessment: Risk of self-harm acutely is low. Risk of self-harm chronically is also low, but could be further elevated in the event of treatment noncompliance and/or AODA.    Crisis Plan:  Symptoms and/or behaviors to indicate a crisis: Excessive worry or fear and Feeling sad or low    What calming techniques or other strategies will patient use to de-escalate and stay safe: slow down, breathe, visualize calming self, think it though, listen to music, change focus, take a walk    Who is one person patient can contact to assist with de-escalation?     Treatment Plan/Goals: Patient will continue supportive psychotherapy efforts and medication regimen as prescribed. Therapist will provide EMDR Therapy to assist patient in improving functioning and gaining coping skills, maintaining stability, and avoiding decompensation and the need for higher level of care. Plan for treatment was discussed during today's visit. Patient acknowledged and verbally consented to continue with current treatment plan and was educated on the importance of compliance with treatment and follow-up appointments.     Patient will contact this office, call 911 or present to the nearest emergency room should suicidal or homicidal ideations occur.     Follow Up:   No follow-ups on file.      BETTINA Rubio   [facility]     This document has been electronically signed by BETTINA Rubio   August 1, 2024 08:46 EDT

## 2024-08-07 ENCOUNTER — OFFICE VISIT (OUTPATIENT)
Dept: PSYCHIATRY | Facility: CLINIC | Age: 54
End: 2024-08-07
Payer: COMMERCIAL

## 2024-08-07 ENCOUNTER — TELEPHONE (OUTPATIENT)
Dept: PSYCHIATRY | Facility: CLINIC | Age: 54
End: 2024-08-07

## 2024-08-07 VITALS
OXYGEN SATURATION: 98 % | BODY MASS INDEX: 26.77 KG/M2 | HEIGHT: 70 IN | HEART RATE: 92 BPM | WEIGHT: 187 LBS | DIASTOLIC BLOOD PRESSURE: 84 MMHG | SYSTOLIC BLOOD PRESSURE: 112 MMHG

## 2024-08-07 DIAGNOSIS — F41.1 GENERALIZED ANXIETY DISORDER: ICD-10-CM

## 2024-08-07 DIAGNOSIS — F33.1 MODERATE EPISODE OF RECURRENT MAJOR DEPRESSIVE DISORDER: Primary | ICD-10-CM

## 2024-08-07 PROCEDURE — 99214 OFFICE O/P EST MOD 30 MIN: CPT | Performed by: NURSE PRACTITIONER

## 2024-08-07 RX ORDER — DESVENLAFAXINE SUCCINATE 50 MG/1
50 TABLET, EXTENDED RELEASE ORAL DAILY
Qty: 30 TABLET | Refills: 1 | Status: SHIPPED | OUTPATIENT
Start: 2024-08-07

## 2024-08-07 NOTE — TELEPHONE ENCOUNTER
Aruna left a voicemail and wanted Lucia to know that doubling up on the Pristiq today has already helped him a lot.

## 2024-08-07 NOTE — PROGRESS NOTES
"Subjective   Aruna Hernandez is a 54 y.o. male who presents today for follow up    Chief Complaint:  Anxiety, depression and poor concentration     History of Present Illness:   History of Present Illness  Aruna Hernandez presents today for medication management follow-up. Reports that he has stopped the Paxil with last dose being approximately 2 weeks ago. Paxil was tapered slowly using taper plan provided last visit.  Experienced side effects with Paxil discontinuation that included brain fog and eye twitching that lasted for approximately 1 week. Currently taking Pristiq 25 mg daily, denies any adverse effects associated with this medication.  Has noticed mood being more irritable, says that he typically wakes up irritable/angry and goes to bed irritable/angry. Has episodes of increased depression, with feeling sad.  Feels that sadness occurs more often in certain situations when feeling his as though he is \"not getting anywhere.\" Has most recently applied for several jobs as he does not feel that his current job is a good fit. Rates overall anxiety \"100\"/10 and rates depression 8-9/10 on a 0-10 scale with 10 being the worst.  Adamantly denies any current SI/HI. Currently seeing BETTINA Rubio every 2 weeks for therapy and feels that sessions have been beneficial.  PHQ-9 total score: 17, CARLOS EDUARDO-7 total score: 17.    Previous Psych Meds: Zoloft (10 years), Paxil (x4 years), Wellbutrin (anger and irritability), Qelbree (ineffective), Adderall XR, Vyvanse, Mydayis    Anxiety  Presents for follow-up visit.  Symptoms include decreased concentration, depressed mood, excessive worry, insomnia, irritability, nervous/anxious behavior, obsessions and malaise/fatigue.  Patient reports no chest pain, dizziness, dry mouth, palpitations, shortness of breath or suicidal ideas. Symptoms occur constantly. The severity of symptoms is moderate. The symptoms are aggravated by family issues, social activities and work stress. The " patient sleeps 6 hours per night. The quality of sleep is poor. His past medical history is significant for depression. Compliance with medications is %.   Depression  Presents for follow-up visit. Symptoms include decreased concentration, depressed mood, excessive worry, insomnia, irritability, nervousness/anxiety, obsessions, malaise/fatigue, difficulty controlling mood, difficulty staying asleep and difficulty falling asleep. Patient is not experiencing: dry mouth, hypersomnia, palpitations, shortness of breath, suicidal ideas, weight gain, weight loss, chest pain and dizziness.Symptoms occur constantly.  The severity of symptoms is moderate.  The patient sleeps 6 hours per night. His past medical history is significant for depression. Compliance with medications is %.      The following portions of the patient's history were reviewed and updated as appropriate: allergies, current medications, past family history, past medical history, past social history, past surgical history and problem list.      Past Medical History:  Past Medical History:   Diagnosis Date    ADHD (attention deficit hyperactivity disorder) 2/28/2023    I have been seeing Dyllan Campbell a local therapist who brought it to my attention.  He did not diagnose, he could not do that.    Depression September 1993    Depressive disorder 03/20/2018       Social History:  Social History     Socioeconomic History    Marital status:    Tobacco Use    Smoking status: Never     Passive exposure: Never    Smokeless tobacco: Never   Vaping Use    Vaping status: Never Used   Substance and Sexual Activity    Alcohol use: Not Currently     Comment: Binge drinker.  No longer drink.    Drug use: Never    Sexual activity: Yes     Partners: Male     Birth control/protection: Condom, Same-sex partner       Family History:  Family History   Problem Relation Age of Onset    Depression Mother         Off and on throughout life.    ADD / ADHD  Father         Bio-Dad, never known him. Step dad raised me.    Alcohol abuse Father     Depression Father     Alcohol abuse Maternal Grandfather         Passed away 11/1986    Alcohol abuse Sister         Half sister    Depression Sister     Drug abuse Sister     Paranoid behavior Sister      Past Surgical History:  Past Surgical History:   Procedure Laterality Date    ABDOMINAL SURGERY  2018    Gallbladder removed    COLONOSCOPY  2022    ENDOSCOPY  2021    HERNIA REPAIR  1972    TONSILLECTOMY  2007     Problem List:  Patient Active Problem List   Diagnosis    Seasonal allergic rhinitis    Restless legs    Overweight    Nausea    Mild intermittent asthma    Inactive tuberculosis    Hyperlipidemia    Depressive disorder    Change in weight     Allergy:   Allergies   Allergen Reactions    Azithromycin Unknown - High Severity    Ibuprofen Unknown - High Severity      Current Medications:   Current Outpatient Medications   Medication Sig Dispense Refill    fluticasone (FLONASE) 50 MCG/ACT nasal spray       Gabapentin Enacarbil ER (Horizant) 300 MG tablet controlled-release       latanoprost (XALATAN) 0.005 % ophthalmic solution       Loratadine 10 MG capsule       montelukast (SINGULAIR) 10 MG tablet       omeprazole (priLOSEC) 20 MG capsule       pravastatin (PRAVACHOL) 40 MG tablet       desvenlafaxine (Pristiq) 50 MG 24 hr tablet Take 1 tablet by mouth Daily. 30 tablet 1     No current facility-administered medications for this visit.     Review of Systems   Constitutional:  Positive for irritability and malaise/fatigue. Negative for activity change, appetite change, unexpected weight gain and unexpected weight loss.   Respiratory:  Negative for shortness of breath.    Cardiovascular:  Negative for chest pain and palpitations.   Neurological:  Negative for dizziness.   Psychiatric/Behavioral:  Positive for decreased concentration, sleep disturbance, depressed mood and stress. Negative for suicidal ideas. The patient  "is nervous/anxious and has insomnia.      Physical Exam  Vitals reviewed.   Constitutional:       General: He is not in acute distress.     Appearance: Normal appearance.   Neurological:      Mental Status: He is alert.      Gait: Gait normal.     Vitals:   Blood pressure 112/84, pulse 92, height 177.8 cm (70\"), weight 84.8 kg (187 lb), SpO2 98%.    Mental Status Exam:   Hygiene:   good  Cooperation:  Cooperative  Eye Contact:  Good  Psychomotor Behavior:  Appropriate  Affect:  Full range and Appropriate  Mood: normal  Hopelessness: Denies  Speech:   Talkative  Thought Process:  Goal directed and Linear  Thought Content:  Mood congruent  Suicidal:  None  Homicidal:  None  Hallucinations:  None  Delusion:  None  Memory:  Intact  Orientation:  Person, Place, Time, and Situation  Reliability:  good  Insight:  Good  Judgement:  Good  Impulse Control:  Good    Assessment & Plan   Problems Addressed this Visit    None  Visit Diagnoses       Moderate episode of recurrent major depressive disorder    -  Primary    Relevant Medications    desvenlafaxine (Pristiq) 50 MG 24 hr tablet    Generalized anxiety disorder        Relevant Medications    desvenlafaxine (Pristiq) 50 MG 24 hr tablet          Diagnoses         Codes Comments    Moderate episode of recurrent major depressive disorder    -  Primary ICD-10-CM: F33.1  ICD-9-CM: 296.32     Generalized anxiety disorder     ICD-10-CM: F41.1  ICD-9-CM: 300.02             Visit Diagnoses:    ICD-10-CM ICD-9-CM   1. Moderate episode of recurrent major depressive disorder  F33.1 296.32   2. Generalized anxiety disorder  F41.1 300.02     Lonnie presents today for medication management follow-up.  Since last visit, he has completely tapered off Paxil and started Pristiq.  Says that he is doing well with Pristiq at 25 mg daily, but does continue to struggle with anxiety, depression and more specifically irritability. Denies any adverse effects of medication. Discussed plan of " medication, will increase Pristiq from 25 mg to 50 mg daily.  Agreeable to a update in 2 weeks.  Will consider increasing Pristiq if symptoms persist. Encouraged to continue counseling with BETTINA Rubio.    -Increase Pristiq from 25 mg to 50 mg daily     -Reviewed previous available documentation and most recent available labs. AMAURI reviewed and is appropriate.    GOALS:  Short Term Goals: Patient will be compliant with medication, and patient will have no significant medication related side effects.  Patient will be engaged in psychotherapy as indicated.  Patient will report subjective improvement of symptoms.  Long term goals: To stabilize mood and treat/improve subjective symptoms, the patient will stay out of the hospital, the patient will be at an optimal level of functioning, and the patient will take all medications as prescribed.  The patient/guardian verbalized understanding and agreement with goals that were mutually set.    TREATMENT PLAN: Continue supportive psychotherapy efforts and medications as indicated for patient's diagnosis.  Pharmacological and Non-Pharmacological treatment options discussed during today's visit. Patient/Guardian acknowledged and verbally consented with current treatment plan and was educated on the importance of compliance with treatment and follow-up appointments.      MEDICATION ISSUES:  Discussed medication options and treatment plan of prescribed medication as well as the risks, benefits, any black box warnings, and side effects including potential falls, possible impaired driving, and metabolic adversities among others. Patient is agreeable to call the office with any worsening of symptoms or onset of side effects, or if any concerns or questions arise.  The contact information for the office is made available to the patient. Patient is agreeable to call 911 or go to the nearest ER should they begin having any SI/HI, or if any urgent concerns arise. No medication  side effects or related complaints today.     MEDS ORDERED DURING VISIT:  New Medications Ordered This Visit   Medications    desvenlafaxine (Pristiq) 50 MG 24 hr tablet     Sig: Take 1 tablet by mouth Daily.     Dispense:  30 tablet     Refill:  1       FOLLOW UP:  Return in about 8 weeks (around 10/2/2024).             This document has been electronically signed by CECILIA Walters  August 7, 2024 09:49 EDT    Please note that portions of this note were completed with a voice recognition program. Efforts were made to edit dictation, but occasionally words are mistranscribed.

## 2024-08-09 ENCOUNTER — TELEPHONE (OUTPATIENT)
Dept: PSYCHIATRY | Facility: CLINIC | Age: 54
End: 2024-08-09
Payer: COMMERCIAL

## 2024-08-09 NOTE — TELEPHONE ENCOUNTER
Pt called and stated that by mid-afternoon yesterday he wasn't feeling like himself. Wants to know if an increase in Pristiq would be a good idea now or if he should give the 50mg more time. Please advise.

## 2024-08-09 NOTE — TELEPHONE ENCOUNTER
I called and left a detailed message (ok per verbal release) per Provider above. Pt instructed to call with any adverse side effects sooner, but also to call to update within the time frame given by Provider on wether medication is benefiting him or not. Left office # for any additional questions or concerns Pt may have.

## 2024-08-15 ENCOUNTER — OFFICE VISIT (OUTPATIENT)
Dept: PSYCHIATRY | Facility: CLINIC | Age: 54
End: 2024-08-15
Payer: COMMERCIAL

## 2024-08-15 DIAGNOSIS — F42.2 MIXED OBSESSIONAL THOUGHTS AND ACTS: ICD-10-CM

## 2024-08-15 DIAGNOSIS — F41.1 GENERALIZED ANXIETY DISORDER: ICD-10-CM

## 2024-08-15 DIAGNOSIS — F33.1 MODERATE EPISODE OF RECURRENT MAJOR DEPRESSIVE DISORDER: Primary | ICD-10-CM

## 2024-08-15 PROCEDURE — 90837 PSYTX W PT 60 MINUTES: CPT | Performed by: COUNSELOR

## 2024-08-15 NOTE — PROGRESS NOTES
Date:08/15/2024   Patient Name: Aruna Hernandez  : 1970   MRN: [id number]   Time IN: 1:30    Time OUT: 2:32     Referring Provider: Amy Conrad MD    PROGRESS NOTE    History of Present Illness:   Aruna Hernandez is a 54 y.o. male who is being seen today for follow up individual Psychotherapy session.     Chief Complaint: Patient arrived at the [facility]. Patient expressed excessive worry, overwhelmed, restlessness, focusing, feeling heard by other, feeling down more than usual, nightmares, flashbacks, and isolation.     ICD-10-CM ICD-9-CM   1. Moderate episode of recurrent major depressive disorder  F33.1 296.32   2. Generalized anxiety disorder  F41.1 300.02        Clinical Maneuvering/Intervention: EMDR phase 1 and 2 to assist with major depressive disorder, CARLOS EDUARDO, OCD    Assisted patient in processing above session content; acknowledged and normalized patient’s thoughts, feelings, and concerns to build appropriate rapport and a positive therapeutic relationship with open and honest communication.  Rationalized patient thought process regarding major depressive disorder, CARLOS EDUARDO, OCD.  Discussed triggers associated with patient's major depressive disorder, CARLOS EDUARDO, OCD.  Also discussed coping skills for patient to implement such as mindful visualization activity, deep breathing, and poetry.    Allowed patient to freely discuss issues without interruption or judgment. Provided safe, confidential environment to facilitate the development of positive therapeutic relationship and encourage open, honest communication. Assisted patient in identifying risk factors which would indicate the need for higher level of care including thoughts to harm self or others and/or self-harming behavior and encouraged patient to contact this office, call 911, or present to the nearest emergency room should any of these events occur. Discussed crisis intervention services and means to access. Patient adamantly and  convincingly denies current suicidal or homicidal ideation or perceptual disturbance.    Assessment Scores:   PHQ-9 Total Score:     CARLOS EDUARDO-7 Score:    PTSD Total Score: .PTSDTOTALSCORE    (Scales based on 0 - 10 with 10 being the worst)  Depression: 5 Anxiety 8       Mental Status Exam:   Hygiene:   good  Cooperation:  Cooperative  Eye Contact:  Good  Psychomotor Behavior:  Restless  Affect:  Appropriate  Mood: normal  Speech:  Normal  Thought Process:  Linear  Thought Content:  Mood congruent  Suicidal:  None  Homicidal:  None  Hallucinations:  None  Delusion:  None  Memory:  Intact  Orientation:  Person, Place, Time, and Situation  Reliability:  good  Insight:  Good  Judgement:  Good  Impulse Control:  Fair  Physical/Medical Issues:  No      Patient's Support Network Includes:      Functional Status: Moderate impairment     Progress toward goal: At goal    Prognosis: Good with Ongoing Treatment     Medications:     Current Outpatient Medications:     desvenlafaxine (Pristiq) 50 MG 24 hr tablet, Take 1 tablet by mouth Daily., Disp: 30 tablet, Rfl: 1    fluticasone (FLONASE) 50 MCG/ACT nasal spray, , Disp: , Rfl:     Gabapentin Enacarbil ER (Horizant) 300 MG tablet controlled-release, , Disp: , Rfl:     latanoprost (XALATAN) 0.005 % ophthalmic solution, , Disp: , Rfl:     Loratadine 10 MG capsule, , Disp: , Rfl:     montelukast (SINGULAIR) 10 MG tablet, , Disp: , Rfl:     omeprazole (priLOSEC) 20 MG capsule, , Disp: , Rfl:     pravastatin (PRAVACHOL) 40 MG tablet, , Disp: , Rfl:     Visit Diagnosis/Orders Placed This Visit:    ICD-10-CM ICD-9-CM   1. Moderate episode of recurrent major depressive disorder  F33.1 296.32   2. Generalized anxiety disorder  F41.1 300.02        PLAN:  Safety: No acute safety concerns  Risk Assessment: Risk of self-harm acutely is low. Risk of self-harm chronically is also low, but could be further elevated in the event of treatment noncompliance and/or AODA.    Crisis  Plan:  Symptoms and/or behaviors to indicate a crisis: Excessive worry or fear and Feeling sad or low    What calming techniques or other strategies will patient use to de-escalate and stay safe: slow down, breathe, visualize calming self, think it though, listen to music, change focus, take a walk    Who is one person patient can contact to assist with de-escalation?      Treatment Plan/Goals: Patient will continue supportive psychotherapy efforts and medication regimen as prescribed. Therapist will provide Cognitive Behavioral Therapy to assist patient in improving functioning and gaining coping skills, maintaining stability, and avoiding decompensation and the need for higher level of care. Plan for treatment was discussed during today's visit. Patient acknowledged and verbally consented to continue with current treatment plan and was educated on the importance of compliance with treatment and follow-up appointments.     Patient will contact this office, call 911 or present to the nearest emergency room should suicidal or homicidal ideations occur.     Follow Up:   No follow-ups on file.      BETTINA Rubio   [facility]     This document has been electronically signed by BETTINA Rubio   August 15, 2024 13:29 EDT

## 2024-08-15 NOTE — TREATMENT PLAN
Multi-Disciplinary Problems (from Behavioral Health Treatment Plan)      Active Problems       Problem: Adjustment  Start Date: 08/15/24      Problem Details: The patient self-scales this problem as a 8 with 10 being the worst.          Goal Priority Start Date Expected End Date End Date    Patient will implement healthy coping strategies. -- 08/15/24 02/13/25 --    Goal Details: Progress toward goal:  Not appropriate to rate progress toward goal since this is the initial treatment plan.          Goal Intervention Frequency Start Date End Date    Assist patient to identify and develop healthy coping strategies PRN 08/15/24 --    Intervention Details: EMDR and CBT techniques to assist with identifying resources to cope with adjusting to new life stressors.  Duration of treatment until remission of symptoms.                  Problem: Anger  Start Date: 08/15/24      Problem Details: The patient self-scales this problem as a 3 with 10 being the worst.          Goal Priority Start Date Expected End Date End Date    Patient will develop specific, socially acceptable way to manage anger. -- 08/15/24 02/13/25 --    Goal Details: Progress toward goal:  Not appropriate to rate progress toward goal since this is the initial treatment plan.          Goal Intervention Frequency Start Date End Date    Process patient's angry feelings or outbursts that have recently occurred and review alternative behaviors PRN 08/15/24 --    Intervention Details: Duration of treatment until remission of symptoms.          Goal Intervention Frequency Start Date End Date    Work with patient to develop constructive way to handle anger. PRN 08/15/24 --    Intervention Details: EMDR and CBT techniques to assist with building resources to identify constructive ways to regulate anger.  Duration of treatment until remission of symptoms.                  Problem: Anxiety  Start Date: 08/15/24      Problem Details: The patient self-scales this problem as  a 9 with 10 being the worst.          Goal Priority Start Date Expected End Date End Date    Patient will develop and implement behavioral and cognitive strategies to reduce anxiety and irrational fears. -- 08/15/24 02/13/25 --    Goal Details: Progress toward goal:  Not appropriate to rate progress toward goal since this is the initial treatment plan.          Goal Intervention Frequency Start Date End Date    Help patient explore past emotional issues in relation to present anxiety. PRN 08/15/24 --    Intervention Details: EMDR and CBT techniques to assist with identifying past emotional issues and how it relates to present anxieties through reprocessing.  Duration of treatment until remission of symptoms.          Goal Intervention Frequency Start Date End Date    Help patient develop an awareness of their cognitive and physical responses to anxiety. PRN 08/15/24 --    Intervention Details: EMDR and CBT techniques by building resources to assist with identifying cognitive and physical responses of the body to anxiety duration of treatment until remission of symptoms.                  Problem: Depression  Start Date: 08/15/24      Problem Details: The patient self-scales this problem as a 7 with 10 being the worst.          Goal Priority Start Date Expected End Date End Date    Patient will demonstrate the ability to initiate new constructive life skills outside of sessions on a consistent basis. -- 08/15/24 02/13/25 --    Goal Details: Progress toward goal:  Not appropriate to rate progress toward goal since this is the initial treatment plan.          Goal Intervention Frequency Start Date End Date    Assist patient in setting attainable activities of daily living goals. PRN 08/15/24 --    Intervention Details: EMDR and CBT techniques to assist with building resources for daily living goals. duration of treatment until remission of symptoms.        Goal Intervention Frequency Start Date End Date    Provide  education about depression PRN 08/15/24 --    Intervention Details: CBT techniques and EMDR to assist with identifying resources to better understanding depression.  Duration of treatment until remission of symptoms.          Goal Intervention Frequency Start Date End Date    Assist patient in developing healthy coping strategies. PRN 08/15/24 --    Intervention Details: EMDR and CBT techniques to assist with building resources to cope with depression.  Duration of treatment until remission of symptoms.                  Problem: Mood Instability  Start Date: 08/15/24      Problem Details: The patient self-scales this problem as a 4 with 10 being the worst.          Goal Priority Start Date Expected End Date End Date    Patient will achieve mood stability as evidenced by controlled behavior and a more deliberate thought process -- 08/15/24 02/13/25 --    Goal Details: Progress toward goal:  Not appropriate to rate progress toward goal since this is the initial treatment plan.          Goal Intervention Frequency Start Date End Date    Provide structure and focus to patient's thoughts and actions by establishing plans and routine. PRN 08/15/24 --    Intervention Details: EMDR and CBT techniques to assist with identifying thoughts and actions to better mood regulation. Duration of treatment until remission of symptoms.          Goal Intervention Frequency Start Date End Date    Assist patient in setting responsible goals and limits in behavior. PRN 08/15/24 --    Intervention Details: EMDR and CBT techniques to assist with creating smart goals to try setting reasonable expectations and boundaries.  Duration of treatment until remission of symptoms.                                 I have discussed and reviewed this treatment plan with the patient.  It has been printed for signatures.

## 2024-08-29 ENCOUNTER — OFFICE VISIT (OUTPATIENT)
Dept: PSYCHIATRY | Facility: CLINIC | Age: 54
End: 2024-08-29
Payer: COMMERCIAL

## 2024-08-29 DIAGNOSIS — F42.2 MIXED OBSESSIONAL THOUGHTS AND ACTS: Primary | ICD-10-CM

## 2024-08-29 PROCEDURE — 90837 PSYTX W PT 60 MINUTES: CPT | Performed by: COUNSELOR

## 2024-08-29 NOTE — PROGRESS NOTES
Date:2024   Patient Name: Aruna Hernandez  : 1970   MRN: [id number]   Time IN: 1:30    Time OUT: 2:29     Referring Provider: Amy Conrad MD    PROGRESS NOTE    History of Present Illness:   Aruna Hernandez is a 54 y.o. male who is being seen today for follow up individual Psychotherapy session.     Chief Complaint:  Patient arrived at [facility].Patient expressed excessive worry, overwhelmed, restlessness, focusing, feeling heard by other, feeling down more than usual, nightmares, flashbacks, and isolation.      ICD-10-CM ICD-9-CM   1. Mixed obsessional thoughts and acts  F42.2 300.3        Clinical Maneuvering/Intervention: EMDR to assist with OCD    Phases utilized of EMDR: Phase 2 Preparation: client stabilization, resource building, EMDR therapy orientation, addressing client's concerns, addressing client's questions, oriented to bilateral forms of dual stimuli (MCKEON) and technical set up, evaluating appropriateness going further.     Allowed patient to freely discuss issues without interruption or judgment. Provided safe, confidential environment to facilitate the development of positive therapeutic relationship and encourage open, honest communication. Assisted patient in identifying risk factors which would indicate the need for higher level of care including thoughts to harm self or others and/or self-harming behavior and encouraged patient to contact this office, call 911, or present to the nearest emergency room should any of these events occur. Discussed crisis intervention services and means to access. Patient adamantly and convincingly denies current suicidal or homicidal ideation or perceptual disturbance.    Assessment Scores:   PHQ-9 Total Score:     CARLOS EDUARDO-7 Score:    PTSD Total Score: .PTSDTOTALSCORE    (Scales based on 0 - 10 with 10 being the worst)  Depression: 3 Anxiety 3       Mental Status Exam:   Hygiene:   good  Cooperation:  Cooperative  Eye Contact:   Good  Psychomotor Behavior:  Restless  Affect:  Appropriate  Mood: normal  Speech:  Normal  Thought Process:  Linear  Thought Content:  Mood congruent  Suicidal:  None  Homicidal:  None  Hallucinations:  None  Delusion:  None  Memory:  Intact  Orientation:  Person, Place, Time, and Situation  Reliability:  good  Insight:  Good  Judgement:  Good  Impulse Control:  Fair  Physical/Medical Issues:  No      Patient's Support Network Includes:      Functional Status: Moderate impairment     Progress toward goal: At goal    Prognosis: Good with Ongoing Treatment     Medications:     Current Outpatient Medications:     desvenlafaxine (Pristiq) 50 MG 24 hr tablet, Take 1 tablet by mouth Daily., Disp: 30 tablet, Rfl: 1    fluticasone (FLONASE) 50 MCG/ACT nasal spray, , Disp: , Rfl:     Gabapentin Enacarbil ER (Horizant) 300 MG tablet controlled-release, , Disp: , Rfl:     latanoprost (XALATAN) 0.005 % ophthalmic solution, , Disp: , Rfl:     Loratadine 10 MG capsule, , Disp: , Rfl:     montelukast (SINGULAIR) 10 MG tablet, , Disp: , Rfl:     omeprazole (priLOSEC) 20 MG capsule, , Disp: , Rfl:     pravastatin (PRAVACHOL) 40 MG tablet, , Disp: , Rfl:     Visit Diagnosis/Orders Placed This Visit:    ICD-10-CM ICD-9-CM   1. Mixed obsessional thoughts and acts  F42.2 300.3        PLAN:  Safety: No acute safety concerns  Risk Assessment: Risk of self-harm acutely is low. Risk of self-harm chronically is also low, but could be further elevated in the event of treatment noncompliance and/or AODA.    Crisis Plan:  Symptoms and/or behaviors to indicate a crisis: Excessive worry or fear, Feeling sad or low, and Prolonged irritability or anger    What calming techniques or other strategies will patient use to de-escalate and stay safe: slow down, breathe, visualize calming self, think it though, listen to music, change focus, take a walk    Who is one person patient can contact to assist with de-escalation?     Treatment  Plan/Goals: Patient will continue supportive psychotherapy efforts and medication regimen as prescribed. Therapist will provide EMDR Therapy to assist patient in improving functioning and gaining coping skills, maintaining stability, and avoiding decompensation and the need for higher level of care. Plan for treatment was discussed during today's visit. Patient acknowledged and verbally consented to continue with current treatment plan and was educated on the importance of compliance with treatment and follow-up appointments.     Patient will contact this office, call 911 or present to the nearest emergency room should suicidal or homicidal ideations occur.     Follow Up:   No follow-ups on file.      BETTINA Rubio   [facility]     This document has been electronically signed by BETTINA Rubio   August 29, 2024 13:28 EDT

## 2024-09-11 ENCOUNTER — OFFICE VISIT (OUTPATIENT)
Dept: PSYCHIATRY | Facility: CLINIC | Age: 54
End: 2024-09-11
Payer: COMMERCIAL

## 2024-09-11 DIAGNOSIS — F42.2 MIXED OBSESSIONAL THOUGHTS AND ACTS: Primary | ICD-10-CM

## 2024-09-11 PROCEDURE — 90837 PSYTX W PT 60 MINUTES: CPT | Performed by: COUNSELOR

## 2024-09-11 NOTE — PROGRESS NOTES
Date:2024   Patient Name: Aruna Hernandez  : 1970   MRN: [id number]   Time IN: 2:24    Time OUT: 3:30     Referring Provider: Amy Conrad MD    PROGRESS NOTE    History of Present Illness:   Aruna Hernandez is a 54 y.o. male who is being seen today for follow up individual Psychotherapy session.     Chief Complaint: Patient arrived at the [facility]. Patient expressed excessive worry, overwhelmed, restlessness, focusing, feeling heard by other, feeling down more than usual, nightmares, flashbacks, and isolation.  Patient expressed identified some self defense mechanisms that he struggles with and created a smart goal to help him in breaking those down and feeling more comfortable with uncomfortable.    ICD-10-CM ICD-9-CM   1. Mixed obsessional thoughts and acts  F42.2 300.3        Clinical Maneuvering/Intervention: EMDR to assist with OCD    Phases utilized of EMDR: Phase 2 Preparation: client stabilization, resource building, EMDR therapy orientation, addressing client's concerns, addressing client's questions, oriented to bilateral forms of dual stimuli (MCKEON) and technical set up, evaluating appropriateness going further.      Allowed patient to freely discuss issues without interruption or judgment. Provided safe, confidential environment to facilitate the development of positive therapeutic relationship and encourage open, honest communication. Assisted patient in identifying risk factors which would indicate the need for higher level of care including thoughts to harm self or others and/or self-harming behavior and encouraged patient to contact this office, call 911, or present to the nearest emergency room should any of these events occur. Discussed crisis intervention services and means to access. Patient adamantly and convincingly denies current suicidal or homicidal ideation or perceptual disturbance.    Assessment Scores:   PHQ-9 Total Score:     CARLOS EDUARDO-7 Score:    PTSD Total  Score: .PTSDTOTALSCORE    (Scales based on 0 - 10 with 10 being the worst)  Depression: 5 Anxiety 5       Mental Status Exam:   Hygiene:   good  Cooperation:  Cooperative  Eye Contact:  Good  Psychomotor Behavior:  Restless  Affect:  Appropriate  Mood: anxious  Speech:  Normal  Thought Process:  Linear  Thought Content:  Mood congruent  Suicidal:  None  Homicidal:  None  Hallucinations:  None  Delusion:  None  Memory:  Intact  Orientation:  Person, Place, Time, and Situation  Reliability:  good  Insight:  Good  Judgement:  Good  Impulse Control:  Fair  Physical/Medical Issues:  No      Patient's Support Network Includes:      Functional Status: Moderate impairment     Progress toward goal: At goal    Prognosis: Good with Ongoing Treatment     Medications:     Current Outpatient Medications:     desvenlafaxine (Pristiq) 50 MG 24 hr tablet, Take 1 tablet by mouth Daily., Disp: 30 tablet, Rfl: 1    fluticasone (FLONASE) 50 MCG/ACT nasal spray, , Disp: , Rfl:     Gabapentin Enacarbil ER (Horizant) 300 MG tablet controlled-release, , Disp: , Rfl:     latanoprost (XALATAN) 0.005 % ophthalmic solution, , Disp: , Rfl:     Loratadine 10 MG capsule, , Disp: , Rfl:     montelukast (SINGULAIR) 10 MG tablet, , Disp: , Rfl:     omeprazole (priLOSEC) 20 MG capsule, , Disp: , Rfl:     pravastatin (PRAVACHOL) 40 MG tablet, , Disp: , Rfl:     Visit Diagnosis/Orders Placed This Visit:    ICD-10-CM ICD-9-CM   1. Mixed obsessional thoughts and acts  F42.2 300.3        PLAN:  Safety: No acute safety concerns  Risk Assessment: Risk of self-harm acutely is low. Risk of self-harm chronically is also low, but could be further elevated in the event of treatment noncompliance and/or AODA.    Crisis Plan:  Symptoms and/or behaviors to indicate a crisis: Excessive worry or fear and Feeling sad or low    What calming techniques or other strategies will patient use to de-escalate and stay safe: slow down, breathe, visualize calming self,  think it though, listen to music, change focus, take a walk    Who is one person patient can contact to assist with de-escalation? Significant other    Treatment Plan/Goals: Patient will continue supportive psychotherapy efforts and medication regimen as prescribed. Therapist will provide EMDR Therapy to assist patient in improving functioning and gaining coping skills, maintaining stability, and avoiding decompensation and the need for higher level of care. Plan for treatment was discussed during today's visit. Patient acknowledged and verbally consented to continue with current treatment plan and was educated on the importance of compliance with treatment and follow-up appointments.     Patient will contact this office, call 911 or present to the nearest emergency room should suicidal or homicidal ideations occur.     Follow Up:   No follow-ups on file.      BETTINA Rubio   [facility]     This document has been electronically signed by BETTINA Rubio   September 11, 2024 14:24 EDT

## 2024-09-26 ENCOUNTER — OFFICE VISIT (OUTPATIENT)
Dept: PSYCHIATRY | Facility: CLINIC | Age: 54
End: 2024-09-26
Payer: COMMERCIAL

## 2024-09-26 DIAGNOSIS — F42.2 MIXED OBSESSIONAL THOUGHTS AND ACTS: Primary | ICD-10-CM

## 2024-09-26 PROCEDURE — 90837 PSYTX W PT 60 MINUTES: CPT | Performed by: COUNSELOR

## 2024-10-02 ENCOUNTER — OFFICE VISIT (OUTPATIENT)
Dept: PSYCHIATRY | Facility: CLINIC | Age: 54
End: 2024-10-02
Payer: COMMERCIAL

## 2024-10-02 VITALS
OXYGEN SATURATION: 98 % | BODY MASS INDEX: 27.72 KG/M2 | HEART RATE: 87 BPM | DIASTOLIC BLOOD PRESSURE: 84 MMHG | HEIGHT: 70 IN | WEIGHT: 193.6 LBS | SYSTOLIC BLOOD PRESSURE: 112 MMHG

## 2024-10-02 DIAGNOSIS — F33.1 MODERATE EPISODE OF RECURRENT MAJOR DEPRESSIVE DISORDER: Primary | ICD-10-CM

## 2024-10-02 DIAGNOSIS — F41.1 GENERALIZED ANXIETY DISORDER: ICD-10-CM

## 2024-10-02 RX ORDER — AZELASTINE HYDROCHLORIDE, FLUTICASONE PROPIONATE 137; 50 UG/1; UG/1
SPRAY, METERED NASAL
COMMUNITY
Start: 2024-08-23

## 2024-10-02 RX ORDER — FENOFIBRATE 40 MG/1
TABLET ORAL
COMMUNITY
Start: 2024-08-26

## 2024-10-02 RX ORDER — DESVENLAFAXINE 100 MG/1
100 TABLET, EXTENDED RELEASE ORAL DAILY
Qty: 30 TABLET | Refills: 1 | Status: SHIPPED | OUTPATIENT
Start: 2024-10-02

## 2024-10-02 RX ORDER — LISINOPRIL 5 MG/1
TABLET ORAL
COMMUNITY
Start: 2024-08-27

## 2024-10-02 NOTE — PROGRESS NOTES
Subjective   Aruna Hernandez is a 54 y.o. male who presents today for follow up    Chief Complaint:  Anxiety, depression and poor concentration     History of Present Illness:   History of Present Illness  Aruna Hernandez presents today for medication management follow-up.  Reports several changes that have occurred since last visit on 8/7/2024. His job was eliminated, so now looking for other employment.  Voices that he did receive a severance package that has been beneficial. Has plans to take college classes at [employer] next fall in hopes of obtaining Master's Degree. Considering employment outside of most recent career path. Says that his mood fluctuates and has been dealing with these recent changes better on some days than others. Reports that mood often fluctuates and says depression has been present often throughout life. He does feel that his mood fluctuates based on triggers or mood of others. Felt improvement in mood after attending [employer] and interacting with others in social setting. Becomes more anxious when going in public places such as grocery store. Says that he would like to start swimming as exercise as thought of gym equipment is very anxiety provoking when thinking of all the germs on the machines. Has continued with routine therapy sessions with recent EMDR. Feels therapy has been beneficial, but unsure if continuing EMDR is good idea due to amount of anxiety experienced after session. Currently seeing [facility] for therapy. PHQ-9 total score: 16, CARLOS EDUARDO-7 total score: 19.    Previous Psych Meds: Zoloft (10 years), Paxil (x4 years), Wellbutrin (anger and irritability), Qelbree (ineffective), Adderall XR, Vyvanse, Mydayis    Depression  Presents for follow-up visit. Symptoms include decreased concentration, depressed mood, excessive worry, insomnia, irritability, nervousness/anxiety, obsessions, malaise/fatigue, difficulty controlling mood, difficulty staying asleep and difficulty falling asleep.  Patient is not experiencing: confusion, dry mouth, hypersomnia, palpitations, shortness of breath, suicidal ideas, weight gain, weight loss, chest pain and dizziness.Symptoms occur constantly.  The severity of symptoms is moderate.  The symptoms are aggravated by nothing. The patient sleeps 6 hours per night. His past medical history is significant for depression. Compliance with medications is %.      The following portions of the patient's history were reviewed and updated as appropriate: allergies, current medications, past family history, past medical history, past social history, past surgical history and problem list.    Past Medical History:   Diagnosis Date    ADHD (attention deficit hyperactivity disorder) 2/28/2023    I have been seeing Dyllan Campbell a local therapist who brought it to my attention.  He did not diagnose, he could not do that.    Depression September 1993    Depressive disorder 03/20/2018     Social History     Socioeconomic History    Marital status:    Tobacco Use    Smoking status: Never     Passive exposure: Never    Smokeless tobacco: Never   Vaping Use    Vaping status: Never Used   Substance and Sexual Activity    Alcohol use: Not Currently     Comment: Binge drinker.  No longer drink.    Drug use: Never    Sexual activity: Yes     Partners: Male     Birth control/protection: Condom, Partner of same sex     Family History   Problem Relation Age of Onset    Depression Mother         Off and on throughout life.    ADD / ADHD Father         Bio-Dad, never known him. Step dad raised me.    Alcohol abuse Father     Depression Father     Alcohol abuse Maternal Grandfather         Passed away 11/1986    Alcohol abuse Sister         Half sister    Depression Sister     Drug abuse Sister     Paranoid behavior Sister      Past Surgical History:   Procedure Laterality Date    ABDOMINAL SURGERY  2018    Gallbladder removed    COLONOSCOPY  2022    ENDOSCOPY  2021    HERNIA REPAIR   "1972    TONSILLECTOMY  2007     Patient Active Problem List   Diagnosis    Seasonal allergic rhinitis    Restless legs    Overweight    Nausea    Mild intermittent asthma    Inactive tuberculosis    Hyperlipidemia    Depressive disorder    Change in weight     Allergies   Allergen Reactions    Azithromycin Unknown - High Severity    Ibuprofen Unknown - High Severity     Current Outpatient Medications   Medication Sig Dispense Refill    Azelastine-Fluticasone 137-50 MCG/ACT suspension       fenofibrate (FENOGLIDE) 40 MG tablet       Gabapentin Enacarbil ER (Horizant) 300 MG tablet controlled-release       latanoprost (XALATAN) 0.005 % ophthalmic solution       lisinopril (PRINIVIL,ZESTRIL) 5 MG tablet       Loratadine 10 MG capsule       montelukast (SINGULAIR) 10 MG tablet       omeprazole (priLOSEC) 20 MG capsule       pravastatin (PRAVACHOL) 40 MG tablet       desvenlafaxine (Pristiq) 100 MG 24 hr tablet Take 1 tablet by mouth Daily. 30 tablet 1     No current facility-administered medications for this visit.     Review of Systems   Constitutional:  Positive for irritability and malaise/fatigue. Negative for activity change, appetite change, unexpected weight gain and unexpected weight loss.   Respiratory:  Negative for shortness of breath.    Cardiovascular:  Negative for chest pain and palpitations.   Neurological:  Negative for dizziness and confusion.   Psychiatric/Behavioral:  Positive for decreased concentration, sleep disturbance, depressed mood and stress. Negative for suicidal ideas. The patient is nervous/anxious and has insomnia.      Physical Exam  Vitals reviewed.   Constitutional:       General: He is not in acute distress.     Appearance: Normal appearance.   Neurological:      Mental Status: He is alert.      Gait: Gait normal.     Vitals:   Blood pressure 112/84, pulse 87, height 177.8 cm (70\"), weight 87.8 kg (193 lb 9.6 oz), SpO2 98%.    Mental Status Exam:   Hygiene:   good  Cooperation:  " Cooperative  Eye Contact:  Good  Psychomotor Behavior:  Appropriate  Affect:  Full range and Appropriate  Mood: normal  Hopelessness: Denies  Speech:   Talkative  Thought Process:  Goal directed and Linear  Thought Content:  Mood congruent  Suicidal:  None  Homicidal:  None  Hallucinations:  None  Delusion:  None  Memory:  Intact  Orientation:  Person, Place, Time, and Situation  Reliability:  good  Insight:  Good  Judgement:  Good  Impulse Control:  Good    Assessment & Plan   Problems Addressed this Visit    None  Visit Diagnoses       Moderate episode of recurrent major depressive disorder    -  Primary    Relevant Medications    desvenlafaxine (Pristiq) 100 MG 24 hr tablet    Generalized anxiety disorder        Relevant Medications    desvenlafaxine (Pristiq) 100 MG 24 hr tablet          Diagnoses         Codes Comments    Moderate episode of recurrent major depressive disorder    -  Primary ICD-10-CM: F33.1  ICD-9-CM: 296.32     Generalized anxiety disorder     ICD-10-CM: F41.1  ICD-9-CM: 300.02             Visit Diagnoses:    ICD-10-CM ICD-9-CM   1. Moderate episode of recurrent major depressive disorder  F33.1 296.32   2. Generalized anxiety disorder  F41.1 300.02     Aruna presents today for medication management follow-up.  Continues to struggle with mood fluctuations that are based on situational triggers.  He finds enjoyment in social settings, but fearful of public settings in certain areas.  Has persistent depression with varying levels of severity. Still becomes irritable and anxious.  Has noticed some improvement with medication regimen, agreeable to increase Pristiq from 50 mg to 100 mg daily for better control of mood.  Discussed other medication options, will consider adding Lamictal to medication regimen if symptoms persist or worsen. Encouraged to continue counseling with BETTINA Rubio.    -Increase Pristiq from 50 mg to 100 mg daily     -Reviewed previous available documentation and most  recent available labs. AMAURI reviewed and is appropriate.    GOALS:  Short Term Goals: Patient will be compliant with medication, and patient will have no significant medication related side effects.  Patient will be engaged in psychotherapy as indicated.  Patient will report subjective improvement of symptoms.  Long term goals: To stabilize mood and treat/improve subjective symptoms, the patient will stay out of the hospital, the patient will be at an optimal level of functioning, and the patient will take all medications as prescribed.  The patient/guardian verbalized understanding and agreement with goals that were mutually set.    TREATMENT PLAN: Continue supportive psychotherapy efforts and medications as indicated for patient's diagnosis.  Pharmacological and Non-Pharmacological treatment options discussed during today's visit. Patient/Guardian acknowledged and verbally consented with current treatment plan and was educated on the importance of compliance with treatment and follow-up appointments.      MEDICATION ISSUES:  Discussed medication options and treatment plan of prescribed medication as well as the risks, benefits, any black box warnings, and side effects including potential falls, possible impaired driving, and metabolic adversities among others. Patient is agreeable to call the office with any worsening of symptoms or onset of side effects, or if any concerns or questions arise.  The contact information for the office is made available to the patient. Patient is agreeable to call 911 or go to the nearest ER should they begin having any SI/HI, or if any urgent concerns arise. No medication side effects or related complaints today.     MEDS ORDERED DURING VISIT:  New Medications Ordered This Visit   Medications    desvenlafaxine (Pristiq) 100 MG 24 hr tablet     Sig: Take 1 tablet by mouth Daily.     Dispense:  30 tablet     Refill:  1       FOLLOW UP:  Return in about 8 weeks (around 11/27/2024)  for Recheck.             This document has been electronically signed by CECILIA Walters  October 2, 2024 11:10 EDT    Please note that portions of this note were completed with a voice recognition program. Efforts were made to edit dictation, but occasionally words are mistranscribed.

## 2024-10-03 ENCOUNTER — OFFICE VISIT (OUTPATIENT)
Dept: PSYCHIATRY | Facility: CLINIC | Age: 54
End: 2024-10-03
Payer: COMMERCIAL

## 2024-10-03 DIAGNOSIS — F42.2 MIXED OBSESSIONAL THOUGHTS AND ACTS: Primary | ICD-10-CM

## 2024-10-03 PROCEDURE — 90837 PSYTX W PT 60 MINUTES: CPT | Performed by: COUNSELOR

## 2024-10-03 NOTE — PROGRESS NOTES
Date:10/03/2024   Patient Name: Aruna Hernandez  : 1970   MRN: [id number]   Time IN: 1:24    Time OUT: 2:27     Referring Provider: Amy Conrad MD    PROGRESS NOTE    History of Present Illness:   Aruna Hernandez is a 54 y.o. male who is being seen today for follow up individual Psychotherapy session.     Chief Complaint:  Patient arrived at the [facility]. Patient expressed excessive worry, overwhelmed, restlessness, focusing, feeling heard by other, feeling down more than usual, nightmares, flashbacks, and isolation.     ICD-10-CM ICD-9-CM   1. Mixed obsessional thoughts and acts  F42.2 300.3        Clinical Maneuvering/Intervention: EMDR to assist with OCD    Phases utilized of EMDR: Phase 2 Preparation: client stabilization, resource building, EMDR therapy orientation, addressing client's concerns, addressing client's questions, oriented to bilateral forms of dual stimuli (MCKEON) and technical set up, evaluating appropriateness going further.    Allowed patient to freely discuss issues without interruption or judgment. Provided safe, confidential environment to facilitate the development of positive therapeutic relationship and encourage open, honest communication. Assisted patient in identifying risk factors which would indicate the need for higher level of care including thoughts to harm self or others and/or self-harming behavior and encouraged patient to contact this office, call 911, or present to the nearest emergency room should any of these events occur. Discussed crisis intervention services and means to access. Patient adamantly and convincingly denies current suicidal or homicidal ideation or perceptual disturbance.    Assessment Scores:   PHQ-9 Total Score:     CARLOS EDUARDO-7 Score:    PTSD Total Score: .PTSDTOTALSCORE    (Scales based on 0 - 10 with 10 being the worst)  Depression: 10 Anxiety 10       Mental Status Exam:   Hygiene:   good  Cooperation:  Cooperative  Eye Contact:   Good  Psychomotor Behavior:  Restless  Affect:  Appropriate  Mood: anxious  Speech:  Normal  Thought Process:  Linear  Thought Content:  Mood congruent  Suicidal:  None  Homicidal:  None  Hallucinations:  None  Delusion:  None  Memory:  Intact  Orientation:  Person, Place, Time, and Situation  Reliability:  good  Insight:  Good  Judgement:  Good  Impulse Control:  Fair  Physical/Medical Issues:  No      Patient's Support Network Includes:      Functional Status: Moderate impairment     Progress toward goal: At goal    Prognosis: Good with Ongoing Treatment     Medications:     Current Outpatient Medications:     Azelastine-Fluticasone 137-50 MCG/ACT suspension, , Disp: , Rfl:     desvenlafaxine (Pristiq) 100 MG 24 hr tablet, Take 1 tablet by mouth Daily., Disp: 30 tablet, Rfl: 1    fenofibrate (FENOGLIDE) 40 MG tablet, , Disp: , Rfl:     Gabapentin Enacarbil ER (Horizant) 300 MG tablet controlled-release, , Disp: , Rfl:     latanoprost (XALATAN) 0.005 % ophthalmic solution, , Disp: , Rfl:     lisinopril (PRINIVIL,ZESTRIL) 5 MG tablet, , Disp: , Rfl:     Loratadine 10 MG capsule, , Disp: , Rfl:     montelukast (SINGULAIR) 10 MG tablet, , Disp: , Rfl:     omeprazole (priLOSEC) 20 MG capsule, , Disp: , Rfl:     pravastatin (PRAVACHOL) 40 MG tablet, , Disp: , Rfl:     Visit Diagnosis/Orders Placed This Visit:    ICD-10-CM ICD-9-CM   1. Mixed obsessional thoughts and acts  F42.2 300.3        PLAN:  Safety: No acute safety concerns  Risk Assessment: Risk of self-harm acutely is low. Risk of self-harm chronically is also low, but could be further elevated in the event of treatment noncompliance and/or AODA.    Crisis Plan:  Symptoms and/or behaviors to indicate a crisis: Excessive worry or fear, Feeling sad or low, Prolonged irritability or anger, and Self-doubt    What calming techniques or other strategies will patient use to de-escalate and stay safe: slow down, breathe, visualize calming self, think it though,  listen to music, change focus, take a walk    Who is one person patient can contact to assist with de-escalation?     Treatment Plan/Goals: Patient will continue supportive psychotherapy efforts and medication regimen as prescribed. Therapist will provide EMDR Therapy to assist patient in improving functioning and gaining coping skills, maintaining stability, and avoiding decompensation and the need for higher level of care. Plan for treatment was discussed during today's visit. Patient acknowledged and verbally consented to continue with current treatment plan and was educated on the importance of compliance with treatment and follow-up appointments.     Patient will contact this office, call 911 or present to the nearest emergency room should suicidal or homicidal ideations occur.     Follow Up:   No follow-ups on file.      BETTINA Rubio   [facility]     This document has been electronically signed by BETTINA Rubio   October 3, 2024 13:24 EDT

## 2024-10-17 ENCOUNTER — OFFICE VISIT (OUTPATIENT)
Dept: PSYCHIATRY | Facility: CLINIC | Age: 54
End: 2024-10-17
Payer: COMMERCIAL

## 2024-10-17 DIAGNOSIS — F42.2 MIXED OBSESSIONAL THOUGHTS AND ACTS: Primary | ICD-10-CM

## 2024-10-17 PROCEDURE — 90837 PSYTX W PT 60 MINUTES: CPT | Performed by: COUNSELOR

## 2024-10-17 NOTE — PROGRESS NOTES
Date:10/17/2024   Patient Name: Aruna Hernandez  : 1970   MRN: [id number]   Time IN: 1:30    Time OUT: 2:25     Referring Provider: Amy Conrad MD    PROGRESS NOTE    History of Present Illness:   Aruna Hernandez is a 54 y.o. male who is being seen today for follow up individual Psychotherapy session.     Chief Complaint:  Patient arrived at the [facility]. Patient expressed some experiences with excessive worry, overwhelm, having things in specific locations, feelings of being on edge, walking on eggshells, focusing on one topic at a time, avoidance, motivation and compulsions.    ICD-10-CM ICD-9-CM   1. Mixed obsessional thoughts and acts  F42.2 300.3        Clinical Maneuvering/Intervention: EMDR to assist with OCD    Phases utilized of EMDR: Phase 2 Preparation: client stabilization, resource building, EMDR therapy orientation, addressing client's concerns, addressing client's questions, oriented to bilateral forms of dual stimuli (MCKEON) and technical set up, evaluating appropriateness going further. Also discussed coping skills for patient to implement such as taking breaks, mindful breathing, music, talking to his significant other and journaling.    Allowed patient to freely discuss issues without interruption or judgment. Provided safe, confidential environment to facilitate the development of positive therapeutic relationship and encourage open, honest communication. Assisted patient in identifying risk factors which would indicate the need for higher level of care including thoughts to harm self or others and/or self-harming behavior and encouraged patient to contact this office, call 911, or present to the nearest emergency room should any of these events occur. Discussed crisis intervention services and means to access. Patient adamantly and convincingly denies current suicidal or homicidal ideation or perceptual disturbance.    Assessment Scores:   PHQ-9 Total Score:    CARLOS EDUARDO-7 Score:     PTSD Total Score: .PTSDTOTALSCORE    (Scales based on 0 - 10 with 10 being the worst)  Depression: N/A Anxiety N/A       Mental Status Exam:   Hygiene:   good  Cooperation:  Cooperative  Eye Contact:  Good  Psychomotor Behavior:  Appropriate  Affect:  Appropriate  Mood: depressed  Speech:  Monotone  Thought Process:  Linear  Thought Content:  Normal  Suicidal:  None  Homicidal:  None  Hallucinations:  None  Delusion:  None  Memory:  Intact  Orientation:  Person, Place, Time, and Situation  Reliability:  fair  Insight:  Fair  Judgement:  Fair  Impulse Control:  Fair  Physical/Medical Issues:  No      Patient's Support Network Includes:  significant other    Functional Status: Moderate impairment     Progress toward goal: At goal    Prognosis: Good with Ongoing Treatment     Medications:     Current Outpatient Medications:     Azelastine-Fluticasone 137-50 MCG/ACT suspension, , Disp: , Rfl:     desvenlafaxine (Pristiq) 100 MG 24 hr tablet, Take 1 tablet by mouth Daily., Disp: 30 tablet, Rfl: 1    fenofibrate (FENOGLIDE) 40 MG tablet, , Disp: , Rfl:     Gabapentin Enacarbil ER (Horizant) 300 MG tablet controlled-release, , Disp: , Rfl:     latanoprost (XALATAN) 0.005 % ophthalmic solution, , Disp: , Rfl:     lisinopril (PRINIVIL,ZESTRIL) 5 MG tablet, , Disp: , Rfl:     Loratadine 10 MG capsule, , Disp: , Rfl:     montelukast (SINGULAIR) 10 MG tablet, , Disp: , Rfl:     omeprazole (priLOSEC) 20 MG capsule, , Disp: , Rfl:     pravastatin (PRAVACHOL) 40 MG tablet, , Disp: , Rfl:     Visit Diagnosis/Orders Placed This Visit:    ICD-10-CM ICD-9-CM   1. Mixed obsessional thoughts and acts  F42.2 300.3        PLAN:  Safety: No acute safety concerns  Risk Assessment: Risk of self-harm acutely is low. Risk of self-harm chronically is also low, but could be further elevated in the event of treatment noncompliance and/or AODA.    Crisis Plan:  Symptoms and/or behaviors to indicate a crisis: Excessive worry or fear, Feeling sad  or low, and Self-doubt    What calming techniques or other strategies will patient use to de-escalate and stay safe: slow down, breathe, visualize calming self, think it though, listen to music, change focus, take a walk    Who is one person patient can contact to assist with de-escalation? Significant other    Treatment Plan/Goals: Patient will continue supportive psychotherapy efforts and medication regimen as prescribed. Therapist will provide EMDR Therapy to assist patient in improving functioning and gaining coping skills, maintaining stability, and avoiding decompensation and the need for higher level of care. Plan for treatment was discussed during today's visit. Patient acknowledged and verbally consented to continue with current treatment plan and was educated on the importance of compliance with treatment and follow-up appointments.     Patient will contact this office, call 911 or present to the nearest emergency room should suicidal or homicidal ideations occur.     Follow Up:   No follow-ups on file.      BETTINA Rubio   [facility]     This document has been electronically signed by BETTINA Rubio   October 17, 2024 13:30 EDT

## 2024-10-31 ENCOUNTER — OFFICE VISIT (OUTPATIENT)
Dept: PSYCHIATRY | Facility: CLINIC | Age: 54
End: 2024-10-31
Payer: COMMERCIAL

## 2024-10-31 DIAGNOSIS — F42.2 MIXED OBSESSIONAL THOUGHTS AND ACTS: Primary | ICD-10-CM

## 2024-10-31 PROCEDURE — 90837 PSYTX W PT 60 MINUTES: CPT | Performed by: COUNSELOR

## 2024-10-31 NOTE — PROGRESS NOTES
Date:10/31/2024   Patient Name: Aruna Hernandez  : 1970   MRN: [id number]   Time IN: 1:17    Time OUT: 2:11     Referring Provider: Amy Conrad MD    PROGRESS NOTE    History of Present Illness:   Aruna Hernandez is a 54 y.o. male who is being seen today for follow up individual Psychotherapy session.     Chief Complaint:   Patient arrived at the [facility]. Patient expressed some experiences with excessive worry, overwhelm, having things in specific locations, feelings of being on edge, walking on eggshells, focusing on one topic at a time, avoidance, motivation and compulsions.  Patient expressed some past events that he has experienced in his life and how he is trying to work through some guilt and shame from those events.  Patient identified some goals to boost his confidence and to help him recognize here and now when he is feeling overwhelmed.    ICD-10-CM ICD-9-CM   1. Mixed obsessional thoughts and acts  F42.2 300.3        Clinical Maneuvering/Intervention: CBT techniques to assist with OCD    Assisted patient in processing above session content; acknowledged and normalized patient’s thoughts, feelings, and concerns to build appropriate rapport and a positive therapeutic relationship with open and honest communication.  Rationalized patient thought process regarding OCD.  Discussed triggers associated with patient's OCD.  Also discussed coping skills for patient to implement such as taking breaks, mindful breathing, venting to friends, music, and journaling.    Allowed patient to freely discuss issues without interruption or judgment. Provided safe, confidential environment to facilitate the development of positive therapeutic relationship and encourage open, honest communication. Assisted patient in identifying risk factors which would indicate the need for higher level of care including thoughts to harm self or others and/or self-harming behavior and encouraged patient to contact this  office, call 911, or present to the nearest emergency room should any of these events occur. Discussed crisis intervention services and means to access. Patient adamantly and convincingly denies current suicidal or homicidal ideation or perceptual disturbance.    Assessment Scores:   PHQ-9 Total Score:     CARLOS EDUARDO-7 Score:    PTSD Total Score: .PTSDTOTALSCORE    (Scales based on 0 - 10 with 10 being the worst)  Depression: N/A Anxiety N/A       Mental Status Exam:   Hygiene:   good  Cooperation:  Cooperative  Eye Contact:  Good  Psychomotor Behavior:  Appropriate  Affect:  Appropriate  Mood: normal  Speech:  Normal  Thought Process:  Linear  Thought Content:  Mood congruent  Suicidal:  None  Homicidal:  None  Hallucinations:  None  Delusion:  None  Memory:  Intact  Orientation:  Person, Place, Time, and Situation  Reliability:  good  Insight:  Good  Judgement:  Good  Impulse Control:  Fair  Physical/Medical Issues:  No      Patient's Support Network Includes:      Functional Status: Moderate impairment     Progress toward goal: At goal    Prognosis: Good with Ongoing Treatment     Medications:     Current Outpatient Medications:     Azelastine-Fluticasone 137-50 MCG/ACT suspension, , Disp: , Rfl:     desvenlafaxine (Pristiq) 100 MG 24 hr tablet, Take 1 tablet by mouth Daily., Disp: 30 tablet, Rfl: 1    fenofibrate (FENOGLIDE) 40 MG tablet, , Disp: , Rfl:     Gabapentin Enacarbil ER (Horizant) 300 MG tablet controlled-release, , Disp: , Rfl:     latanoprost (XALATAN) 0.005 % ophthalmic solution, , Disp: , Rfl:     lisinopril (PRINIVIL,ZESTRIL) 5 MG tablet, , Disp: , Rfl:     Loratadine 10 MG capsule, , Disp: , Rfl:     montelukast (SINGULAIR) 10 MG tablet, , Disp: , Rfl:     omeprazole (priLOSEC) 20 MG capsule, , Disp: , Rfl:     pravastatin (PRAVACHOL) 40 MG tablet, , Disp: , Rfl:     Visit Diagnosis/Orders Placed This Visit:    ICD-10-CM ICD-9-CM   1. Mixed obsessional thoughts and acts  F42.2 300.3         PLAN:  Safety: No acute safety concerns  Risk Assessment: Risk of self-harm acutely is low. Risk of self-harm chronically is also low, but could be further elevated in the event of treatment noncompliance and/or AODA.    Crisis Plan:  Symptoms and/or behaviors to indicate a crisis: Excessive worry or fear, Feeling sad or low, and Prolonged irritability or anger    What calming techniques or other strategies will patient use to de-escalate and stay safe: slow down, breathe, visualize calming self, think it though, listen to music, change focus, take a walk    Who is one person patient can contact to assist with de-escalation?  Significant other    Treatment Plan/Goals: Patient will continue supportive psychotherapy efforts and medication regimen as prescribed. Therapist will provide Cognitive Behavioral Therapy to assist patient in improving functioning and gaining coping skills, maintaining stability, and avoiding decompensation and the need for higher level of care. Plan for treatment was discussed during today's visit. Patient acknowledged and verbally consented to continue with current treatment plan and was educated on the importance of compliance with treatment and follow-up appointments.     Patient will contact this office, call 911 or present to the nearest emergency room should suicidal or homicidal ideations occur.     Follow Up:   No follow-ups on file.      BETTINA Rubio   [facility]     This document has been electronically signed by BETTINA Rubio   October 31, 2024 13:16 EDT

## 2024-11-27 ENCOUNTER — OFFICE VISIT (OUTPATIENT)
Dept: PSYCHIATRY | Facility: CLINIC | Age: 54
End: 2024-11-27
Payer: COMMERCIAL

## 2024-11-27 DIAGNOSIS — F42.2 MIXED OBSESSIONAL THOUGHTS AND ACTS: Primary | ICD-10-CM

## 2024-11-27 PROCEDURE — 90837 PSYTX W PT 60 MINUTES: CPT | Performed by: COUNSELOR

## 2024-11-27 NOTE — PROGRESS NOTES
Date:2024   Patient Name: Aruna Hernandez  : 1970   MRN: [id number]   Time IN: 2:30    Time OUT: 2:30    Referring Provider: Amy Conrad MD    PROGRESS NOTE    History of Present Illness:   Aruna Hernandez is a 54 y.o. male who is being seen today for follow up individual Psychotherapy session.     Chief Complaint:  Patient arrived at the [facility]. Patient expressed some experiences with excessive worry, overwhelm, having things in specific locations, feelings of being on edge, walking on eggshells, focusing on one topic at a time, avoidance, motivation and compulsions.     ICD-10-CM ICD-9-CM   1. Mixed obsessional thoughts and acts  F42.2 300.3        Clinical Maneuvering/Intervention: EMDR to assist with OCD    Phases utilized of EMDR: Phase 2 Preparation: client stabilization, resource building, EMDR therapy orientation, addressing client's concerns, addressing client's questions, oriented to bilateral forms of dual stimuli (MCKEON) and technical set up, evaluating appropriateness going further.    Allowed patient to freely discuss issues without interruption or judgment. Provided safe, confidential environment to facilitate the development of positive therapeutic relationship and encourage open, honest communication. Assisted patient in identifying risk factors which would indicate the need for higher level of care including thoughts to harm self or others and/or self-harming behavior and encouraged patient to contact this office, call 911, or present to the nearest emergency room should any of these events occur. Discussed crisis intervention services and means to access. Patient adamantly and convincingly denies current suicidal or homicidal ideation or perceptual disturbance.    Assessment Scores:   PHQ-9 Total Score:    CARLOS EDUARDO-7 Score:    PTSD Total Score: .PTSDTOTALSCORE    (Scales based on 0 - 10 with 10 being the worst)  Depression: 5 Anxiety 5       Mental Status Exam:   Hygiene:    good  Cooperation:  Cooperative  Eye Contact:  Good  Psychomotor Behavior:  Appropriate  Affect:  Appropriate  Mood: anxious  Speech:  Normal  Thought Process:  Linear  Thought Content:  Mood congruent  Suicidal:  None  Homicidal:  None  Hallucinations:  None  Delusion:  None  Memory:  Intact  Orientation:  Person, Place, Time, and Situation  Reliability:  good  Insight:  Good  Judgement:  Good  Impulse Control:  Fair  Physical/Medical Issues:  No      Patient's Support Network Includes:      Functional Status: Moderate impairment     Progress toward goal: At goal    Prognosis: Good with Ongoing Treatment     Medications:     Current Outpatient Medications:     Azelastine-Fluticasone 137-50 MCG/ACT suspension, , Disp: , Rfl:     desvenlafaxine (Pristiq) 100 MG 24 hr tablet, Take 1 tablet by mouth Daily., Disp: 30 tablet, Rfl: 1    fenofibrate (FENOGLIDE) 40 MG tablet, , Disp: , Rfl:     Gabapentin Enacarbil ER (Horizant) 300 MG tablet controlled-release, , Disp: , Rfl:     latanoprost (XALATAN) 0.005 % ophthalmic solution, , Disp: , Rfl:     lisinopril (PRINIVIL,ZESTRIL) 5 MG tablet, , Disp: , Rfl:     Loratadine 10 MG capsule, , Disp: , Rfl:     montelukast (SINGULAIR) 10 MG tablet, , Disp: , Rfl:     omeprazole (priLOSEC) 20 MG capsule, , Disp: , Rfl:     pravastatin (PRAVACHOL) 40 MG tablet, , Disp: , Rfl:     Visit Diagnosis/Orders Placed This Visit:    ICD-10-CM ICD-9-CM   1. Mixed obsessional thoughts and acts  F42.2 300.3        PLAN:  Safety: No acute safety concerns  Risk Assessment: Risk of self-harm acutely is low. Risk of self-harm chronically is also low, but could be further elevated in the event of treatment noncompliance and/or AODA.    Crisis Plan:  Symptoms and/or behaviors to indicate a crisis: Excessive worry or fear, Feeling sad or low, Prolonged irritability or anger, and Self-doubt    What calming techniques or other strategies will patient use to de-escalate and stay safe: slow down,  breathe, visualize calming self, think it though, listen to music, change focus, take a walk    Who is one person patient can contact to assist with de-escalation?      Treatment Plan/Goals: Patient will continue supportive psychotherapy efforts and medication regimen as prescribed. Therapist will provide EMDR Therapy to assist patient in improving functioning and gaining coping skills, maintaining stability, and avoiding decompensation and the need for higher level of care. Plan for treatment was discussed during today's visit. Patient acknowledged and verbally consented to continue with current treatment plan and was educated on the importance of compliance with treatment and follow-up appointments.     Patient will contact this office, call 911 or present to the nearest emergency room should suicidal or homicidal ideations occur.     Follow Up:   No follow-ups on file.      BETTINA Rubio   [facility]     This document has been electronically signed by BETTINA Rubio   November 27, 2024 13:32 EST

## 2024-12-09 DIAGNOSIS — F41.1 GENERALIZED ANXIETY DISORDER: ICD-10-CM

## 2024-12-09 DIAGNOSIS — F33.1 MODERATE EPISODE OF RECURRENT MAJOR DEPRESSIVE DISORDER: ICD-10-CM

## 2024-12-10 RX ORDER — DESVENLAFAXINE 100 MG/1
100 TABLET, EXTENDED RELEASE ORAL DAILY
Qty: 30 TABLET | Refills: 1 | Status: SHIPPED | OUTPATIENT
Start: 2024-12-10

## 2024-12-23 ENCOUNTER — OFFICE VISIT (OUTPATIENT)
Dept: PSYCHIATRY | Facility: CLINIC | Age: 54
End: 2024-12-23
Payer: COMMERCIAL

## 2024-12-23 DIAGNOSIS — F42.2 MIXED OBSESSIONAL THOUGHTS AND ACTS: Primary | ICD-10-CM

## 2024-12-23 PROCEDURE — 90837 PSYTX W PT 60 MINUTES: CPT | Performed by: COUNSELOR

## 2024-12-23 NOTE — PROGRESS NOTES
Date:2024   Patient Name: Aruna Hernandez  : 1970   MRN: [id number]   Time IN: 1:30    Time OUT: 1:28     Referring Provider: Amy Conrad MD    PROGRESS NOTE    History of Present Illness:   Aruna Hernandez is a 54 y.o. male who is being seen today for follow up individual Psychotherapy session.     Chief Complaint:  Patient arrived at the [facility][1]. Patient expressed some experiences with excessive worry, overwhelm, having things in specific locations, feelings of being on edge, walking on eggshells, focusing on one topic at a time, avoidance, motivation and compulsions.     ICD-10-CM ICD-9-CM   1. Mixed obsessional thoughts and acts  F42.2 300.3        Clinical Maneuvering/Intervention: EMDR to assist with OCD    Phases utilized of EMDR: Phase 2 Preparation: client stabilization, resource building, EMDR therapy orientation, addressing client's concerns, addressing client's questions, oriented to bilateral forms of dual stimuli (MCKEON) and technical set up, evaluating appropriateness going further.    Allowed patient to freely discuss issues without interruption or judgment. Provided safe, confidential environment to facilitate the development of positive therapeutic relationship and encourage open, honest communication. Assisted patient in identifying risk factors which would indicate the need for higher level of care including thoughts to harm self or others and/or self-harming behavior and encouraged patient to contact this office, call 911, or present to the nearest emergency room should any of these events occur. Discussed crisis intervention services and means to access. Patient adamantly and convincingly denies current suicidal or homicidal ideation or perceptual disturbance.    Assessment Scores:   PHQ-9 Total Score:    CARLOS EDUARDO-7 Score:    PTSD Total Score: .PTSDTOTALSCORE    (Scales based on 0 - 10 with 10 being the worst)  Depression: 8 Anxiety 8       Mental Status Exam:   Hygiene:    good  Cooperation:  Cooperative  Eye Contact:  Good  Psychomotor Behavior:  Restless  Affect:  Appropriate  Mood: anxious  Speech:  Normal  Thought Process:  Linear  Thought Content:  Mood congruent  Suicidal:  None  Homicidal:  None  Hallucinations:  None  Delusion:  None  Memory:  Intact  Orientation:  Person, Place, Time, and Situation  Reliability:  good  Insight:  Good  Judgement:  Good  Impulse Control:  Fair  Physical/Medical Issues:  No      Patient's Support Network Includes:      Functional Status: Moderate impairment     Progress toward goal: At goal    Prognosis: Good with Ongoing Treatment     Medications:     Current Outpatient Medications:     Azelastine-Fluticasone 137-50 MCG/ACT suspension, , Disp: , Rfl:     desvenlafaxine (Pristiq) 100 MG 24 hr tablet, Take 1 tablet by mouth Daily., Disp: 30 tablet, Rfl: 1    fenofibrate (FENOGLIDE) 40 MG tablet, , Disp: , Rfl:     Gabapentin Enacarbil ER (Horizant) 300 MG tablet controlled-release, , Disp: , Rfl:     latanoprost (XALATAN) 0.005 % ophthalmic solution, , Disp: , Rfl:     lisinopril (PRINIVIL,ZESTRIL) 5 MG tablet, , Disp: , Rfl:     Loratadine 10 MG capsule, , Disp: , Rfl:     montelukast (SINGULAIR) 10 MG tablet, , Disp: , Rfl:     omeprazole (priLOSEC) 20 MG capsule, , Disp: , Rfl:     pravastatin (PRAVACHOL) 40 MG tablet, , Disp: , Rfl:     Visit Diagnosis/Orders Placed This Visit:    ICD-10-CM ICD-9-CM   1. Mixed obsessional thoughts and acts  F42.2 300.3        PLAN:  Safety: No acute safety concerns  Risk Assessment: Risk of self-harm acutely is low. Risk of self-harm chronically is also low, but could be further elevated in the event of treatment noncompliance and/or AODA.    Crisis Plan:  Symptoms and/or behaviors to indicate a crisis: Excessive worry or fear, Feeling sad or low, and Self-doubt    What calming techniques or other strategies will patient use to de-escalate and stay safe: slow down, breathe, visualize calming self,  think it though, listen to music, change focus, take a walk    Who is one person patient can contact to assist with de-escalation?     Treatment Plan/Goals: Patient will continue supportive psychotherapy efforts and medication regimen as prescribed. Therapist will provide EMDR Therapy to assist patient in improving functioning and gaining coping skills, maintaining stability, and avoiding decompensation and the need for higher level of care. Plan for treatment was discussed during today's visit. Patient acknowledged and verbally consented to continue with current treatment plan and was educated on the importance of compliance with treatment and follow-up appointments.     Patient will contact this office, call 911 or present to the nearest emergency room should suicidal or homicidal ideations occur.     Follow Up:   No follow-ups on file.      BETTINA Rubio   [facility]     This document has been electronically signed by BETTINA Rubio   December 23, 2024 13:29 EST

## 2025-01-09 ENCOUNTER — OFFICE VISIT (OUTPATIENT)
Dept: PSYCHIATRY | Facility: CLINIC | Age: 55
End: 2025-01-09
Payer: COMMERCIAL

## 2025-01-09 DIAGNOSIS — F42.2 MIXED OBSESSIONAL THOUGHTS AND ACTS: Primary | ICD-10-CM

## 2025-01-09 PROCEDURE — 90837 PSYTX W PT 60 MINUTES: CPT | Performed by: COUNSELOR

## 2025-01-09 NOTE — PROGRESS NOTES
Date:2025   Patient Name: Aruna Hernandez  : 1970   MRN: [id number]   Time IN: 1:27    Time OUT: 2:30     Referring Provider: Amy Conrad MD    PROGRESS NOTE    History of Present Illness:   Aruna Hernandez is a 54 y.o. male who is being seen today for follow up individual Psychotherapy session.     Chief Complaint:   Patient arrived at the [facility]. Patient expressed some experiences with excessive worry, overwhelm, having things in specific locations, feelings of being on edge, walking on eggshells, focusing on one topic at a time, avoidance, motivation and compulsions.  Patient identified a sense of connection that he feels he has been missing out on and different areas of his life.  Patient participated in a parts work activity to help him with connecting with different emotions and identifying areas of struggle.    ICD-10-CM ICD-9-CM   1. Mixed obsessional thoughts and acts  F42.2 300.3        Clinical Maneuvering/Intervention: EMDR to assist with OCD    Phases utilized of EMDR: Phase 2 Preparation: client stabilization, resource building, EMDR therapy orientation, addressing client's concerns, addressing client's questions, oriented to bilateral forms of dual stimuli (MCKEON) and technical set up, evaluating appropriateness going further.     Allowed patient to freely discuss issues without interruption or judgment. Provided safe, confidential environment to facilitate the development of positive therapeutic relationship and encourage open, honest communication. Assisted patient in identifying risk factors which would indicate the need for higher level of care including thoughts to harm self or others and/or self-harming behavior and encouraged patient to contact this office, call 911, or present to the nearest emergency room should any of these events occur. Discussed crisis intervention services and means to access. Patient adamantly and convincingly denies current suicidal or  homicidal ideation or perceptual disturbance.    Assessment Scores:   PHQ-9 Total Score:    CARLOS EDUARDO-7 Score:    PTSD Total Score: .PTSDTOTALSCORE    (Scales based on 0 - 10 with 10 being the worst)  Depression: 5 Anxiety 5       Mental Status Exam:   Hygiene:   good  Cooperation:  Cooperative  Eye Contact:  Good  Psychomotor Behavior:  Appropriate  Affect:  Appropriate  Mood: anxious  Speech:  Normal  Thought Process:  Linear  Thought Content:  Mood congruent  Suicidal:  None  Homicidal:  None  Hallucinations:  None  Delusion:  None  Memory:  Intact  Orientation:  Person, Place, Time, and Situation  Reliability:  good  Insight:  Good  Judgement:  Good  Impulse Control:  Fair  Physical/Medical Issues:  No      Patient's Support Network Includes:      Functional Status: Moderate impairment     Progress toward goal: At goal    Prognosis: Good with Ongoing Treatment     Medications:     Current Outpatient Medications:     Azelastine-Fluticasone 137-50 MCG/ACT suspension, , Disp: , Rfl:     desvenlafaxine (Pristiq) 100 MG 24 hr tablet, Take 1 tablet by mouth Daily., Disp: 30 tablet, Rfl: 1    fenofibrate (FENOGLIDE) 40 MG tablet, , Disp: , Rfl:     Gabapentin Enacarbil ER (Horizant) 300 MG tablet controlled-release, , Disp: , Rfl:     latanoprost (XALATAN) 0.005 % ophthalmic solution, , Disp: , Rfl:     lisinopril (PRINIVIL,ZESTRIL) 5 MG tablet, , Disp: , Rfl:     Loratadine 10 MG capsule, , Disp: , Rfl:     montelukast (SINGULAIR) 10 MG tablet, , Disp: , Rfl:     omeprazole (priLOSEC) 20 MG capsule, , Disp: , Rfl:     pravastatin (PRAVACHOL) 40 MG tablet, , Disp: , Rfl:     Visit Diagnosis/Orders Placed This Visit:    ICD-10-CM ICD-9-CM   1. Mixed obsessional thoughts and acts  F42.2 300.3        PLAN:  Safety: No acute safety concerns  Risk Assessment: Risk of self-harm acutely is low. Risk of self-harm chronically is also low, but could be further elevated in the event of treatment noncompliance and/or  AODA.    Crisis Plan:  Symptoms and/or behaviors to indicate a crisis: Excessive worry or fear, Feeling sad or low, and Self-doubt    What calming techniques or other strategies will patient use to de-escalate and stay safe: slow down, breathe, visualize calming self, think it though, listen to music, change focus, take a walk    Who is one person patient can contact to assist with de-escalation?  Significant other    Treatment Plan/Goals: Patient will continue supportive psychotherapy efforts and medication regimen as prescribed. Therapist will provide EMDR Therapy to assist patient in improving functioning and gaining coping skills, maintaining stability, and avoiding decompensation and the need for higher level of care. Plan for treatment was discussed during today's visit. Patient acknowledged and verbally consented to continue with current treatment plan and was educated on the importance of compliance with treatment and follow-up appointments.     Patient will contact this office, call 911 or present to the nearest emergency room should suicidal or homicidal ideations occur.     Follow Up:   No follow-ups on file.      BETTINA Rubio   [facility]     This document has been electronically signed by BETTINA Rubio   January 9, 2025 13:28 EST

## 2025-01-10 ENCOUNTER — TELEMEDICINE (OUTPATIENT)
Dept: PSYCHIATRY | Facility: CLINIC | Age: 55
End: 2025-01-10
Payer: COMMERCIAL

## 2025-01-10 DIAGNOSIS — F41.1 GENERALIZED ANXIETY DISORDER: Primary | ICD-10-CM

## 2025-01-10 DIAGNOSIS — F33.1 MODERATE EPISODE OF RECURRENT MAJOR DEPRESSIVE DISORDER: ICD-10-CM

## 2025-01-10 NOTE — PROGRESS NOTES
Mode of Visit: Video   Location of patient: -HOME-   Location of provider: +[facility]+   You have chosen to receive care through a telehealth visit.   The patient has signed the video visit consent form.   The visit included audio and video interaction. No technical issues occurred during this visit.     Ji Hernandez is a 54 y.o. male who presents today for follow up    Chief Complaint:  Anxiety, depression and poor concentration     History of Present Illness:   History of Present Illness  Aruna Hernandez presents via MyChart video visit for medication management follow up. Last visit in office was 10/2/24. Currently taking Pristiq 100 mg daily. Reports continued symptoms of nervousness, anxiety, worry, irritability and poor sleep. Also experiences times of feeling more depressed, down and low energy. Voices that he does feel medication has been effective to decrease severity of symptoms. Sleeping about six hours per night. Currently seeing Charles Moreira [facility] for therapy and feels sessions are beneficial.   Had recent labs that were abnormal, has been dx with Vitamin D deficiency and was referred to Endocrinology with appt in March for hyperparathyroidism. 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     Previous Psych Meds: Zoloft (10 years), Paxil (x4 years), Wellbutrin (anger and irritability), Qelbree (ineffective), Adderall XR, Vyvanse, Mydayis    Depression  Presents for follow-up visit. Symptoms include excessive worry, insomnia, irritability, nervousness/anxiety, obsessions, malaise/fatigue, difficulty controlling mood, difficulty staying asleep and difficulty falling asleep. Patient is not experiencing: confusion, dry mouth, hypersomnia, palpitations, shortness of breath, suicidal ideas, weight gain, weight loss, chest pain and dizziness.Symptoms occur constantly.  The severity of symptoms is moderate.  The symptoms are aggravated by family issues, social activities and work  stress. The patient sleeps 6 hours per night. His past medical history is significant for depression. Compliance with medications is %.      The following portions of the patient's history were reviewed and updated as appropriate: allergies, current medications, past family history, past medical history, past social history, past surgical history and problem list.    Past Medical History:   Diagnosis Date    ADHD (attention deficit hyperactivity disorder) 2/28/2023    I have been seeing Dyllan Campbell a local therapist who brought it to my attention.  He did not diagnose, he could not do that.    Depression September 1993    Depressive disorder 03/20/2018     Social History     Socioeconomic History    Marital status:    Tobacco Use    Smoking status: Never     Passive exposure: Never    Smokeless tobacco: Never   Vaping Use    Vaping status: Never Used   Substance and Sexual Activity    Alcohol use: Not Currently     Comment: Binge drinker.  No longer drink.    Drug use: Never    Sexual activity: Yes     Partners: Male     Birth control/protection: Condom, Partner of same sex     Family History   Problem Relation Age of Onset    Depression Mother         Off and on throughout life.    ADD / ADHD Father         Bio-Dad, never known him. Step dad raised me.    Alcohol abuse Father     Depression Father     Alcohol abuse Maternal Grandfather         Passed away 11/1986    Alcohol abuse Sister         Half sister    Depression Sister     Drug abuse Sister     Paranoid behavior Sister      Past Surgical History:   Procedure Laterality Date    ABDOMINAL SURGERY  2018    Gallbladder removed    COLONOSCOPY  2022    ENDOSCOPY  2021    HERNIA REPAIR  1972    TONSILLECTOMY  2007     Patient Active Problem List   Diagnosis    Seasonal allergic rhinitis    Restless legs    Overweight    Nausea    Mild intermittent asthma    Inactive tuberculosis    Hyperlipidemia    Depressive disorder    Change in weight      Allergies   Allergen Reactions    Azithromycin Unknown - High Severity    Ibuprofen Unknown - High Severity     Current Outpatient Medications   Medication Sig Dispense Refill    desvenlafaxine (Pristiq) 100 MG 24 hr tablet Take 1 tablet by mouth Daily. 30 tablet 2    Azelastine-Fluticasone 137-50 MCG/ACT suspension       fenofibrate (FENOGLIDE) 40 MG tablet       Gabapentin Enacarbil ER (Horizant) 300 MG tablet controlled-release       latanoprost (XALATAN) 0.005 % ophthalmic solution       lisinopril (PRINIVIL,ZESTRIL) 5 MG tablet       Loratadine 10 MG capsule       montelukast (SINGULAIR) 10 MG tablet       omeprazole (priLOSEC) 20 MG capsule       pravastatin (PRAVACHOL) 40 MG tablet        No current facility-administered medications for this visit.     Review of Systems   Constitutional:  Positive for irritability and malaise/fatigue. Negative for activity change, appetite change, unexpected weight gain and unexpected weight loss.   Respiratory:  Negative for shortness of breath.    Cardiovascular:  Negative for chest pain and palpitations.   Neurological:  Negative for dizziness and confusion.   Psychiatric/Behavioral:  Positive for dysphoric mood and sleep disturbance. Negative for suicidal ideas. The patient is nervous/anxious and has insomnia.      Physical Exam  Constitutional:       General: He is not in acute distress.     Appearance: Normal appearance.   Neurological:      Mental Status: He is alert.     Vitals:   The patient was seen remotely today via a MyChart Video Visit through [facility]. Unable to obtain vital signs due to nature of remote visit.    Mental Status Exam:   Hygiene:    appears good  Cooperation:  Cooperative  Eye Contact:   REBEKAH r/t video visit  Psychomotor Behavior:  Appropriate  Affect:  Full range and Appropriate  Mood: normal  Hopelessness: Denies  Speech:   Talkative  Thought Process:  Goal directed and Linear  Thought Content:  Mood congruent  Suicidal:  None  Homicidal:   None  Hallucinations:  None  Delusion:  None  Memory:  Intact  Orientation:  Person, Place, Time, and Situation  Reliability:  good  Insight:  Good  Judgement:  Good  Impulse Control:  Good    Assessment & Plan   Problems Addressed this Visit    None  Visit Diagnoses       Generalized anxiety disorder    -  Primary    Relevant Medications    desvenlafaxine (Pristiq) 100 MG 24 hr tablet    Moderate episode of recurrent major depressive disorder        Relevant Medications    desvenlafaxine (Pristiq) 100 MG 24 hr tablet          Diagnoses         Codes Comments    Generalized anxiety disorder    -  Primary ICD-10-CM: F41.1  ICD-9-CM: 300.02     Moderate episode of recurrent major depressive disorder     ICD-10-CM: F33.1  ICD-9-CM: 296.32             Visit Diagnoses:    ICD-10-CM ICD-9-CM   1. Generalized anxiety disorder  F41.1 300.02   2. Moderate episode of recurrent major depressive disorder  F33.1 296.32     Aruna presents for medication management follow up. Still experiencing symptoms of depression and anxiety with severity of symptoms improved with medication. Also engages in routine therapy that is beneficial. Will continue with Pristiq 100 mg daily. [facility]our[facility]    -Continue Pristiq 100 mg daily     -Reviewed previous available documentation and most recent available labs. AMAURI reviewed and is appropriate.    GOALS:  Short Term Goals: Patient will be compliant with medication, and patient will have no significant medication related side effects.  Patient will be engaged in psychotherapy as indicated.  Patient will report subjective improvement of symptoms.  Long term goals: To stabilize mood and treat/improve subjective symptoms, the patient will stay out of the hospital, the patient will be at an optimal level of functioning, and the patient will take all medications as prescribed.  The patient/guardian verbalized understanding and agreement with goals that  were mutually set.    TREATMENT PLAN: Continue supportive psychotherapy efforts and medications as indicated for patient's diagnosis.  Pharmacological and Non-Pharmacological treatment options discussed during today's visit. Patient/Guardian acknowledged and verbally consented with current treatment plan and was educated on the importance of compliance with treatment and follow-up appointments.      MEDICATION ISSUES:  Discussed medication options and treatment plan of prescribed medication as well as the risks, benefits, any black box warnings, and side effects including potential falls, possible impaired driving, and metabolic adversities among others. Patient is agreeable to call the office with any worsening of symptoms or onset of side effects, or if any concerns or questions arise.  The contact information for the office is made available to the patient. Patient is agreeable to call 911 or go to the nearest ER should they begin having any SI/HI, or if any urgent concerns arise. No medication side effects or related complaints today.     MEDS ORDERED DURING VISIT:  New Medications Ordered This Visit   Medications    desvenlafaxine (Pristiq) 100 MG 24 hr tablet     Sig: Take 1 tablet by mouth Daily.     Dispense:  30 tablet     Refill:  2       FOLLOW UP:  Return in about 3 months (around 4/10/2025) for Recheck.             This document has been electronically signed by CECILAI Walters  February 7, 2025 21:49 EST    Please note that portions of this note were completed with a voice recognition program. Efforts were made to edit dictation, but occasionally words are mistranscribed.

## 2025-02-06 ENCOUNTER — OFFICE VISIT (OUTPATIENT)
Dept: PSYCHIATRY | Facility: CLINIC | Age: 55
End: 2025-02-06
Payer: COMMERCIAL

## 2025-02-06 DIAGNOSIS — F42.2 MIXED OBSESSIONAL THOUGHTS AND ACTS: Primary | ICD-10-CM

## 2025-02-06 PROCEDURE — 90837 PSYTX W PT 60 MINUTES: CPT | Performed by: COUNSELOR

## 2025-02-06 NOTE — PROGRESS NOTES
Date:2025   Patient Name: Aruna Hernandez  : 1970   MRN: [id number]   Time IN: 1:28    Time OUT: 2:30    Referring Provider: Amy Conrad MD    PROGRESS NOTE    History of Present Illness:   Aruna Hernandez is a 54 y.o. male who is being seen today for follow up individual Psychotherapy session.     Chief Complaint:  atient arrived at the [facility]. Patient expressed some experiences with excessive worry, overwhelm, having things in specific locations, feelings of being on edge, walking on eggshells, focusing on one topic at a time, avoidance, motivation and compulsions.  Patient identified some progress he is making in classes, and relationships, and time with himself.    ICD-10-CM ICD-9-CM   1. Mixed obsessional thoughts and acts  F42.2 300.3        Clinical Maneuvering/Intervention: EMDR to assist with OCD    Phases utilized of EMDR: Phase 2 Preparation: client stabilization, resource building, EMDR therapy orientation, addressing client's concerns, addressing client's questions, oriented to bilateral forms of dual stimuli (MCKEON) and technical set up, evaluating appropriateness going further.    Allowed patient to freely discuss issues without interruption or judgment. Provided safe, confidential environment to facilitate the development of positive therapeutic relationship and encourage open, honest communication. Assisted patient in identifying risk factors which would indicate the need for higher level of care including thoughts to harm self or others and/or self-harming behavior and encouraged patient to contact this office, call 911, or present to the nearest emergency room should any of these events occur. Discussed crisis intervention services and means to access. Patient adamantly and convincingly denies current suicidal or homicidal ideation or perceptual disturbance.    Assessment Scores:   PHQ-9 Total Score:    CARLOS EDUARDO-7 Score:    PTSD Total Score: .PTSDTOTALSCORE    (Scales  based on 0 - 10 with 10 being the worst)  Depression: 7 Anxiety 9       Mental Status Exam:   Hygiene:   good  Cooperation:  Cooperative  Eye Contact:  Good  Psychomotor Behavior:  Appropriate  Affect:  Appropriate  Mood: anxious  Speech:  Normal  Thought Process:  Linear  Thought Content:  Mood congruent  Suicidal:  None  Homicidal:  None  Hallucinations:  None  Delusion:  None  Memory:  Intact  Orientation:  Person, Place, Time, and Situation  Reliability:  good  Insight:  Good  Judgement:  Good  Impulse Control:  Fair  Physical/Medical Issues:  No      Patient's Support Network Includes:      Functional Status: Moderate impairment     Progress toward goal: At goal    Prognosis: Good with Ongoing Treatment     Medications:     Current Outpatient Medications:     Azelastine-Fluticasone 137-50 MCG/ACT suspension, , Disp: , Rfl:     desvenlafaxine (Pristiq) 100 MG 24 hr tablet, Take 1 tablet by mouth Daily., Disp: 30 tablet, Rfl: 1    fenofibrate (FENOGLIDE) 40 MG tablet, , Disp: , Rfl:     Gabapentin Enacarbil ER (Horizant) 300 MG tablet controlled-release, , Disp: , Rfl:     latanoprost (XALATAN) 0.005 % ophthalmic solution, , Disp: , Rfl:     lisinopril (PRINIVIL,ZESTRIL) 5 MG tablet, , Disp: , Rfl:     Loratadine 10 MG capsule, , Disp: , Rfl:     montelukast (SINGULAIR) 10 MG tablet, , Disp: , Rfl:     omeprazole (priLOSEC) 20 MG capsule, , Disp: , Rfl:     pravastatin (PRAVACHOL) 40 MG tablet, , Disp: , Rfl:     Visit Diagnosis/Orders Placed This Visit:    ICD-10-CM ICD-9-CM   1. Mixed obsessional thoughts and acts  F42.2 300.3        PLAN:  Safety: No acute safety concerns  Risk Assessment: Risk of self-harm acutely is low. Risk of self-harm chronically is also low, but could be further elevated in the event of treatment noncompliance and/or AODA.    Crisis Plan:  Symptoms and/or behaviors to indicate a crisis: Excessive worry or fear, Feeling sad or low, and Self-doubt    What calming techniques or  other strategies will patient use to de-escalate and stay safe: slow down, breathe, visualize calming self, think it though, listen to music, change focus, take a walk    Who is one person patient can contact to assist with de-escalation?     Treatment Plan/Goals: Patient will continue supportive psychotherapy efforts and medication regimen as prescribed. Therapist will provide EMDR Therapy to assist patient in improving functioning and gaining coping skills, maintaining stability, and avoiding decompensation and the need for higher level of care. Plan for treatment was discussed during today's visit. Patient acknowledged and verbally consented to continue with current treatment plan and was educated on the importance of compliance with treatment and follow-up appointments.     Patient will contact this office, call 911 or present to the nearest emergency room should suicidal or homicidal ideations occur.     Follow Up:   No follow-ups on file.      BETTINA Rubio   [facility] [place]     This document has been electronically signed by BETTINA Rubio   February 6, 2025 13:27 EST

## 2025-02-07 RX ORDER — DESVENLAFAXINE 100 MG/1
100 TABLET, EXTENDED RELEASE ORAL DAILY
Qty: 30 TABLET | Refills: 2 | Status: SHIPPED | OUTPATIENT
Start: 2025-02-07

## 2025-02-20 ENCOUNTER — OFFICE VISIT (OUTPATIENT)
Dept: PSYCHIATRY | Facility: CLINIC | Age: 55
End: 2025-02-20
Payer: COMMERCIAL

## 2025-02-20 DIAGNOSIS — F42.2 MIXED OBSESSIONAL THOUGHTS AND ACTS: Primary | ICD-10-CM

## 2025-02-20 PROCEDURE — 90837 PSYTX W PT 60 MINUTES: CPT | Performed by: COUNSELOR

## 2025-02-20 NOTE — PROGRESS NOTES
Date:2025   Patient Name: Aruna Hernandez  : 1970   MRN: [id number]   Time IN: 1:27    Time OUT: 2:27     Referring Provider: Amy Conrad MD    PROGRESS NOTE    History of Present Illness:   Aruna Hernandez is a 54 y.o. male who is being seen today for follow up individual Psychotherapy session.     Chief Complaint:  Patient arrived at the [facility]. Patient expressed some experiences with excessive worry, overwhelm, having things in specific locations, feelings of being on edge, walking on eggshells, focusing on one topic at a time, avoidance, motivation and compulsions.     ICD-10-CM ICD-9-CM   1. Mixed obsessional thoughts and acts  F42.2 300.3        Clinical Maneuvering/Intervention: EMDR to assist with OCD    Phases utilized of EMDR: Phase 2 Preparation: client stabilization, resource building, EMDR therapy orientation, addressing client's concerns, addressing client's questions, oriented to bilateral forms of dual stimuli (MCKEON) and technical set up, evaluating appropriateness going further.  Phase 3 Assessment: setting up target sequences for reprocessing of the traumatic memories.  Phase 4 Desensitization: application of bilateral MCKEON to target sequence for the purpose of moving disturbances through the adaptive resolution.  Phase 5 installation: Following the previous phases, using bilateral MCKEON to reinforce the positive or preferred cognition that the client arrived at as the result of work and phase 4.  Phase 6 body scan: Checking in with any shifts of the occurred and the client's body is results of the decent citation and installation.  Phase 7 closure: Procedures assuring for client grounded at the end of the individual therapy sessions and at the end of a given target sequence.  Target memory: When mom lied about dad  Negative Cognition: I am in significant  Positive Cognition: I am whole  SUDs Start:10  SUDs End: Did not complete phase 4  VOC Start:1  VOC End: Did not  complete phase 4    Allowed patient to freely discuss issues without interruption or judgment. Provided safe, confidential environment to facilitate the development of positive therapeutic relationship and encourage open, honest communication. Assisted patient in identifying risk factors which would indicate the need for higher level of care including thoughts to harm self or others and/or self-harming behavior and encouraged patient to contact this office, call 911, or present to the nearest emergency room should any of these events occur. Discussed crisis intervention services and means to access. Patient adamantly and convincingly denies current suicidal or homicidal ideation or perceptual disturbance.    Assessment Scores:   PHQ-9 Total Score:    CARLOS EDUARDO-7 Score:    PTSD Total Score: .PTSDTOTALSCORE    (Scales based on 0 - 10 with 10 being the worst)  Depression: 2 Anxiety 5       Mental Status Exam:   Hygiene:   good  Cooperation:  Cooperative  Eye Contact:  Good  Psychomotor Behavior:  Appropriate  Affect:  Appropriate  Mood: anxious  Speech:  Normal  Thought Process:  Linear  Thought Content:  Mood congruent  Suicidal:  None  Homicidal:  None  Hallucinations:  None  Delusion:  None  Memory:  Intact  Orientation:  Person, Place, Time, and Situation  Reliability:  good  Insight:  Good  Judgement:  Good  Impulse Control:  Fair  Physical/Medical Issues:  No      Patient's Support Network Includes:      Functional Status: Moderate impairment     Progress toward goal: At goal    Prognosis: Good with Ongoing Treatment     Medications:     Current Outpatient Medications:     Azelastine-Fluticasone 137-50 MCG/ACT suspension, , Disp: , Rfl:     desvenlafaxine (Pristiq) 100 MG 24 hr tablet, Take 1 tablet by mouth Daily., Disp: 30 tablet, Rfl: 2    fenofibrate (FENOGLIDE) 40 MG tablet, , Disp: , Rfl:     Gabapentin Enacarbil ER (Horizant) 300 MG tablet controlled-release, , Disp: , Rfl:     latanoprost (XALATAN) 0.005  % ophthalmic solution, , Disp: , Rfl:     lisinopril (PRINIVIL,ZESTRIL) 5 MG tablet, , Disp: , Rfl:     Loratadine 10 MG capsule, , Disp: , Rfl:     montelukast (SINGULAIR) 10 MG tablet, , Disp: , Rfl:     omeprazole (priLOSEC) 20 MG capsule, , Disp: , Rfl:     pravastatin (PRAVACHOL) 40 MG tablet, , Disp: , Rfl:     Visit Diagnosis/Orders Placed This Visit:    ICD-10-CM ICD-9-CM   1. Mixed obsessional thoughts and acts  F42.2 300.3        PLAN:  Safety: No acute safety concerns  Risk Assessment: Risk of self-harm acutely is low. Risk of self-harm chronically is also low, but could be further elevated in the event of treatment noncompliance and/or AODA.    Crisis Plan:  Symptoms and/or behaviors to indicate a crisis: Excessive worry or fear, Feeling sad or low, Prolonged irritability or anger, Lack of sleep, and Self-doubt    What calming techniques or other strategies will patient use to de-escalate and stay safe: slow down, breathe, visualize calming self, think it though, listen to music, change focus, take a walk    Who is one person patient can contact to assist with de-escalation?      Treatment Plan/Goals: Patient will continue supportive psychotherapy efforts and medication regimen as prescribed. Therapist will provide EMDR Therapy to assist patient in improving functioning and gaining coping skills, maintaining stability, and avoiding decompensation and the need for higher level of care. Plan for treatment was discussed during today's visit. Patient acknowledged and verbally consented to continue with current treatment plan and was educated on the importance of compliance with treatment and follow-up appointments.     Patient will contact this office, call 911 or present to the nearest emergency room should suicidal or homicidal ideations occur.     Follow Up:   No follow-ups on file.      Charles Moreira [facility] [place]     This document has been electronically signed by Charles Moreira  [facility]   February 20, 2025 13:27 EST   No

## 2025-03-06 ENCOUNTER — TELEMEDICINE (OUTPATIENT)
Dept: PSYCHIATRY | Facility: CLINIC | Age: 55
End: 2025-03-06
Payer: COMMERCIAL

## 2025-03-06 DIAGNOSIS — F42.2 MIXED OBSESSIONAL THOUGHTS AND ACTS: Primary | ICD-10-CM

## 2025-03-06 PROCEDURE — 90837 PSYTX W PT 60 MINUTES: CPT | Performed by: COUNSELOR

## 2025-03-06 NOTE — PROGRESS NOTES
The visit included audio and video interaction. No technical issues occurred during this visit.Telehealth Encounter Note:  Date of Service: 2025  Patient Name: Aruna Hernandez  : 1970   MRN: [id number]   Time In: 1:29  Time Out: 2:25    Mode of Visit: Video  Location of patient: -HOME-  Location of provider: +HOME+  You have chosen to receive care through a telehealth visit.  The patient has signed the video visit consent form.       You have chosen to receive care through a telehealth visit.  Do you consent to use a video/audio connection for your medical care today? Yes    Referring Provider: Amy Conrad MD    PROGRESS NOTE    History of Present Illness:   Aruna Hernandez is a 54 y.o. male who is being seen today for follow up individual Psychotherapy session.     Chief Complaint:  Patient arrived online for his telehealth appointment. Patient expressed some struggles with excessive worry, over whelmed, restlessness, compulsions to do complete tasks, focusing, repetitive behaviors to assist with the compulsions, and sleep. Patient identified a smart goal to continue practicing his coping skills.    ICD-10-CM ICD-9-CM   1. Mixed obsessional thoughts and acts  F42.2 300.3          Clinical Maneuvering/Intervention: CBT technique to assist with OCD    Assisted patient in processing above session content; acknowledged and normalized patient’s thoughts, feelings, and concerns by utilizing a person-centered approach in efforts to build appropriate rapport and a positive therapeutic relationship with open and honest communication.  Rationalized patient thought process regarding OCD.  Discussed triggers associated with patient's OCD.  Also discussed coping skills for patient to implement such as Mindful breathing, taking breaks, spending time with pets, and poetry.    Allowed patient to freely discuss issues without interruption or judgment. Provided safe, confidential environment to facilitate  the development of positive therapeutic relationship and encourage open, honest communication. Assisted patient in identifying risk factors which would indicate the need for higher level of care including thoughts to harm self or others and/or self-harming behavior and encouraged patient to contact this office, call 911, or present to the nearest emergency room should any of these events occur. Discussed crisis intervention services and means to access. Patient adamantly and convincingly denies current suicidal or homicidal ideation or perceptual disturbance.    Assessment Scores:   PHQ-9 Total Score: PHQ-9 Total Score: (Patient-Rptd) 10  CARLOS EDUARDO-7 Score: Feeling nervous, anxious or on edge: (Patient-Rptd) Several days  Not being able to stop or control worrying: (Patient-Rptd) Several days  Worrying too much about different things: (Patient-Rptd) Several days  Trouble Relaxing: (Patient-Rptd) Several days  Being so restless that it is hard to sit still: (Patient-Rptd) Several days  Feeling afraid as if something awful might happen: (Patient-Rptd) Several days  Becoming easily annoyed or irritable: (Patient-Rptd) Several days  CARLOS EDUARDO 7 Total Score: (Patient-Rptd) 7  If you checked any problems, how difficult have these problems made it for you to do your work, take care of things at home, or get along with other people: (Patient-Rptd) Somewhat difficult    Mental Status Exam:   Hygiene:   good  Cooperation:  Cooperative  Eye Contact:  Good  Psychomotor Behavior:  Restless  Affect:  Appropriate  Mood: depressed and anxious  Speech:  Normal  Thought Process:  Goal directed  Thought Content:  Mood congruent  Suicidal:  None  Homicidal:  None  Hallucinations:  None  Delusion:  None  Memory:  Intact  Orientation:  Person, Place, Time, and Situation  Reliability:  good  Insight:  Good  Judgement:  Good  Impulse Control:  Fair  Physical/Medical Issues:  No      Patient's Support Network Includes:  significant other    Functional  Status: Moderate impairment     Progress toward goal: At goal    Prognosis: Good with Ongoing Treatment     Medications:     Current Outpatient Medications:     Azelastine-Fluticasone 137-50 MCG/ACT suspension, , Disp: , Rfl:     desvenlafaxine (Pristiq) 100 MG 24 hr tablet, Take 1 tablet by mouth Daily., Disp: 30 tablet, Rfl: 2    fenofibrate (FENOGLIDE) 40 MG tablet, , Disp: , Rfl:     Gabapentin Enacarbil ER (Horizant) 300 MG tablet controlled-release, , Disp: , Rfl:     latanoprost (XALATAN) 0.005 % ophthalmic solution, , Disp: , Rfl:     lisinopril (PRINIVIL,ZESTRIL) 5 MG tablet, , Disp: , Rfl:     Loratadine 10 MG capsule, , Disp: , Rfl:     montelukast (SINGULAIR) 10 MG tablet, , Disp: , Rfl:     omeprazole (priLOSEC) 20 MG capsule, , Disp: , Rfl:     pravastatin (PRAVACHOL) 40 MG tablet, , Disp: , Rfl:     Visit Diagnosis/Orders Placed This Visit:    ICD-10-CM ICD-9-CM   1. Mixed obsessional thoughts and acts  F42.2 300.3        PLAN:  Safety: No acute safety concerns  Risk Assessment: Risk of self-harm acutely is low. Risk of self-harm chronically is also low, but could be further elevated in the event of treatment noncompliance and/or AODA.    Crisis Plan:  Symptoms and/or behaviors to indicate a crisis: Excessive worry or fear, Feeling sad or low, and Self-doubt    What calming techniques or other strategies will patient use to de-escalate and stay safe: slow down, breathe, visualize calming self, think it though, listen to music, change focus, take a walk    Who is one person patient can contact to assist with de-escalation? Significant other and friend    Treatment Plan/Goals: Patient will continue supportive psychotherapy efforts and medication regimen as prescribed. Therapist will provide Cognitive Behavioral Therapy to assist patient in improving functioning and gaining coping skills, maintaining stability, and avoiding decompensation and the need for higher level of care. Plan for treatment was  discussed during today's visit. Patient acknowledged and verbally consented to continue with current treatment plan and was educated on the importance of compliance with treatment and follow-up appointments.     Patient will contact this office, call 911 or present to the nearest emergency room should suicidal or homicidal ideations occur.     Follow Up:   No follow-ups on file.      BETTINA Rubio   [facility] [place]     This document has been electronically signed by BETTINA Rubio   March 6, 2025 13:29 EST

## 2025-03-20 ENCOUNTER — OFFICE VISIT (OUTPATIENT)
Dept: PSYCHIATRY | Facility: CLINIC | Age: 55
End: 2025-03-20
Payer: COMMERCIAL

## 2025-03-20 DIAGNOSIS — F42.2 MIXED OBSESSIONAL THOUGHTS AND ACTS: Primary | ICD-10-CM

## 2025-03-20 PROCEDURE — 90837 PSYTX W PT 60 MINUTES: CPT | Performed by: COUNSELOR

## 2025-03-20 NOTE — PROGRESS NOTES
Date:2025   Patient Name: Aruna Hernandez  : 1970   MRN: [id number]   Time IN: 1:30    Time OUT: 2:30     Referring Provider: Amy Conrad MD    PROGRESS NOTE    History of Present Illness:   Aruna Hernandez is a 54 y.o. male who is being seen today for follow up individual Psychotherapy session.     Chief Complaint: Patient arrived at the [facility].  Patient voiced concerns with insomnia, excessive worry, feeling down and out, relational stressors, overwhelm, feelings of being on edge, walking on eggshells, focusing on one topic at a time, avoidance, motivation and compulsions.     ICD-10-CM ICD-9-CM   1. Mixed obsessional thoughts and acts  F42.2 300.3        Clinical Maneuvering/Intervention: EMDR to assist with OCD    Phases utilized of EMDR: Phase 2 Preparation: client stabilization, resource building, EMDR therapy orientation, addressing client's concerns, addressing client's questions, oriented to bilateral forms of dual stimuli (MCKEON) and technical set up, evaluating appropriateness going further.      Allowed patient to freely discuss issues without interruption or judgment. Provided safe, confidential environment to facilitate the development of positive therapeutic relationship and encourage open, honest communication. Assisted patient in identifying risk factors which would indicate the need for higher level of care including thoughts to harm self or others and/or self-harming behavior and encouraged patient to contact this office, call 911, or present to the nearest emergency room should any of these events occur. Discussed crisis intervention services and means to access. Patient adamantly and convincingly denies current suicidal or homicidal ideation or perceptual disturbance.      (Scales based on 0 - 10 with 10 being the worst)  Depression: 7 Anxiety 8       Mental Status Exam:   Hygiene:   good  Cooperation:  Cooperative  Eye Contact:  Good  Psychomotor Behavior:   Appropriate  Affect:  Appropriate  Mood: normal  Speech:  Normal  Thought Process:  Goal directed and Linear  Thought Content:  Normal  Suicidal:  None  Homicidal:  None  Hallucinations:  None  Delusion:  None  Memory:  Intact  Orientation:  Person, Place, Time, and Situation  Reliability:  good  Insight:  Good  Judgement:  Fair  Impulse Control:  Fair  Physical/Medical Issues:  Yes patient reported difficulties with sleep      Patient's Support Network Includes:      Functional Status: Moderate impairment     Progress toward goal: At goal    Prognosis: Good with Ongoing Treatment     Medications:     Current Outpatient Medications:     Azelastine-Fluticasone 137-50 MCG/ACT suspension, , Disp: , Rfl:     desvenlafaxine (Pristiq) 100 MG 24 hr tablet, Take 1 tablet by mouth Daily., Disp: 30 tablet, Rfl: 2    fenofibrate (FENOGLIDE) 40 MG tablet, , Disp: , Rfl:     Gabapentin Enacarbil ER (Horizant) 300 MG tablet controlled-release, , Disp: , Rfl:     latanoprost (XALATAN) 0.005 % ophthalmic solution, , Disp: , Rfl:     lisinopril (PRINIVIL,ZESTRIL) 5 MG tablet, , Disp: , Rfl:     Loratadine 10 MG capsule, , Disp: , Rfl:     montelukast (SINGULAIR) 10 MG tablet, , Disp: , Rfl:     omeprazole (priLOSEC) 20 MG capsule, , Disp: , Rfl:     pravastatin (PRAVACHOL) 40 MG tablet, , Disp: , Rfl:     Visit Diagnosis/Orders Placed This Visit:    ICD-10-CM ICD-9-CM   1. Mixed obsessional thoughts and acts  F42.2 300.3        PLAN:  Safety: No acute safety concerns  Risk Assessment: Risk of self-harm acutely is low. Risk of self-harm chronically is also low, but could be further elevated in the event of treatment noncompliance and/or AODA.    Crisis Plan:  Symptoms and/or behaviors to indicate a crisis: Excessive worry or fear, Feeling sad or low, Isolation, Lack of sleep, and Self-doubt    What calming techniques or other strategies will patient use to de-escalate and stay safe: slow down, breathe, visualize calming self,  think it though, listen to music, change focus, take a walk    Who is one person patient can contact to assist with de-escalation?   and friends    Treatment Plan/Goals: Patient will continue supportive psychotherapy efforts and medication regimen as prescribed. Therapist will provide EMDR Therapy to assist patient in improving functioning and gaining coping skills, maintaining stability, and avoiding decompensation and the need for higher level of care. Plan for treatment was discussed during today's visit. Patient acknowledged and verbally consented to continue with current treatment plan and was educated on the importance of compliance with treatment and follow-up appointments.     Patient will contact this office, call 911 or present to the nearest emergency room should suicidal or homicidal ideations occur.     Follow Up:   No follow-ups on file.      BETTINA Rubio   [facility]     This document has been electronically signed by BETTINA Rubio   March 20, 2025 13:26 EDT

## 2025-04-03 ENCOUNTER — OFFICE VISIT (OUTPATIENT)
Dept: PSYCHIATRY | Facility: CLINIC | Age: 55
End: 2025-04-03
Payer: COMMERCIAL

## 2025-04-03 DIAGNOSIS — F42.2 MIXED OBSESSIONAL THOUGHTS AND ACTS: Primary | ICD-10-CM

## 2025-04-03 PROCEDURE — 90837 PSYTX W PT 60 MINUTES: CPT | Performed by: COUNSELOR

## 2025-04-03 NOTE — PROGRESS NOTES
Date:2025   Patient Name: Aruna Hernandez  : 1970   MRN: [id number]   Time IN: 1:30    Time OUT: 2:30     Referring Provider: Amy Conrad MD    PROGRESS NOTE    History of Present Illness:   Aruna Hernandez is a 54 y.o. male who is being seen today for follow up individual Psychotherapy session.     Chief Complaint: Patient arrived to [facility].  Patient expressed concerns with feeling down and out, mood regulation, difficulty sleeping, impulsivity, excessive worry, overwhelmed, feelings of being on edge, and focusing on one topic at a time, lack of motivation, and compulsions .    ICD-10-CM ICD-9-CM   1. Mixed obsessional thoughts and acts  F42.2 300.3        Clinical Maneuvering/Intervention: EMDR to assist with OCD    Phases utilized of EMDR: Phase 2 Preparation: client stabilization, resource building, EMDR therapy orientation, addressing client's concerns, addressing client's questions, oriented to bilateral forms of dual stimuli (MCKEON) and technical set up, evaluating appropriateness going further.      Allowed patient to freely discuss issues without interruption or judgment. Provided safe, confidential environment to facilitate the development of positive therapeutic relationship and encourage open, honest communication. Assisted patient in identifying risk factors which would indicate the need for higher level of care including thoughts to harm self or others and/or self-harming behavior and encouraged patient to contact this office, call 911, or present to the nearest emergency room should any of these events occur. Discussed crisis intervention services and means to access. Patient adamantly and convincingly denies current suicidal or homicidal ideation or perceptual disturbance.      (Scales based on 0 - 10 with 10 being the worst)  Depression: 9 Anxiety 9       Mental Status Exam:   Hygiene:   good  Cooperation:  Cooperative  Eye Contact:  Good  Psychomotor Behavior:   Restless  Affect:  Appropriate  Mood: depressed  Speech:  Normal  Thought Process:  Goal directed and Linear  Thought Content:  Normal  Suicidal:  None  Homicidal:  None  Hallucinations:  None  Delusion:  None  Memory:  Intact  Orientation:  Person, Place, Time, and Situation  Reliability:  good  Insight:  Fair  Judgement:  Fair  Impulse Control:  Fair  Physical/Medical Issues:  No      Patient's Support Network Includes:  significant other    Functional Status: Moderate impairment     Progress toward goal: At goal    Prognosis: Good with Ongoing Treatment     Medications:     Current Outpatient Medications:     Azelastine-Fluticasone 137-50 MCG/ACT suspension, , Disp: , Rfl:     desvenlafaxine (Pristiq) 100 MG 24 hr tablet, Take 1 tablet by mouth Daily., Disp: 30 tablet, Rfl: 2    fenofibrate (FENOGLIDE) 40 MG tablet, , Disp: , Rfl:     Gabapentin Enacarbil ER (Horizant) 300 MG tablet controlled-release, , Disp: , Rfl:     latanoprost (XALATAN) 0.005 % ophthalmic solution, , Disp: , Rfl:     lisinopril (PRINIVIL,ZESTRIL) 5 MG tablet, , Disp: , Rfl:     Loratadine 10 MG capsule, , Disp: , Rfl:     montelukast (SINGULAIR) 10 MG tablet, , Disp: , Rfl:     omeprazole (priLOSEC) 20 MG capsule, , Disp: , Rfl:     pravastatin (PRAVACHOL) 40 MG tablet, , Disp: , Rfl:     Visit Diagnosis/Orders Placed This Visit:    ICD-10-CM ICD-9-CM   1. Mixed obsessional thoughts and acts  F42.2 300.3        PLAN:  Safety: No acute safety concerns  Risk Assessment: Risk of self-harm acutely is low. Risk of self-harm chronically is also low, but could be further elevated in the event of treatment noncompliance and/or AODA.    Crisis Plan:  Symptoms and/or behaviors to indicate a crisis: Excessive worry or fear, Feeling sad or low, Prolonged irritability or anger, Isolation, Lack of sleep, and Self-doubt    What calming techniques or other strategies will patient use to de-escalate and stay safe: slow down, breathe, visualize calming self,  think it though, listen to music, change focus, take a walk    Who is one person patient can contact to assist with de-escalation?  Significant other    Treatment Plan/Goals: Patient will continue supportive psychotherapy efforts and medication regimen as prescribed. Therapist will provide EMDR Therapy to assist patient in improving functioning and gaining coping skills, maintaining stability, and avoiding decompensation and the need for higher level of care. Plan for treatment was discussed during today's visit. Patient acknowledged and verbally consented to continue with current treatment plan and was educated on the importance of compliance with treatment and follow-up appointments.     Patient will contact this office, call 911 or present to the nearest emergency room should suicidal or homicidal ideations occur.     Follow Up:   No follow-ups on file.      BETTINA Rubio   [facility]     This document has been electronically signed by BETTINA Rubio   April 3, 2025 13:31 EDT

## 2025-04-09 ENCOUNTER — TELEPHONE (OUTPATIENT)
Dept: PSYCHIATRY | Facility: CLINIC | Age: 55
End: 2025-04-09
Payer: COMMERCIAL

## 2025-04-09 NOTE — TELEPHONE ENCOUNTER
Pt called and stated that you had discussed another layer of medication that could be added to assist with mood swings. States he will talk more about it with his therapist as well. Please advise on any medication adjustments that can be made.

## 2025-04-10 ENCOUNTER — OFFICE VISIT (OUTPATIENT)
Dept: PSYCHIATRY | Facility: CLINIC | Age: 55
End: 2025-04-10
Payer: COMMERCIAL

## 2025-04-10 DIAGNOSIS — F42.2 MIXED OBSESSIONAL THOUGHTS AND ACTS: Primary | ICD-10-CM

## 2025-04-10 PROCEDURE — 90837 PSYTX W PT 60 MINUTES: CPT | Performed by: COUNSELOR

## 2025-04-10 NOTE — PROGRESS NOTES
Date:04/10/2025   Patient Name: Aruna Hernandez  : 1970   MRN: [id number]   Time IN: 1:29    Time OUT: 2:29     Referring Provider: Amy Conrad MD    PROGRESS NOTE    History of Present Illness:   Aruna Hernandez is a 54 y.o. male who is being seen today for follow up individual Psychotherapy session.     Chief Complaint: Patient arrived at the [facility].  Patient expressed concerns with excessive worry, walking on eggshells, feelings of on edge, overwhelmed, restlessness, compulsions to do things, counting obsessively, anger, and overthinking.    ICD-10-CM ICD-9-CM   1. Mixed obsessional thoughts and acts  F42.2 300.3        Clinical Maneuvering/Intervention: EMDR to assist with OCD    Phases utilized of EMDR: Phase 2 Preparation: client stabilization, resource building, EMDR therapy orientation, addressing client's concerns, addressing client's questions, oriented to bilateral forms of dual stimuli (MCKEON) and technical set up, evaluating appropriateness going further.      Allowed patient to freely discuss issues without interruption or judgment. Provided safe, confidential environment to facilitate the development of positive therapeutic relationship and encourage open, honest communication. Assisted patient in identifying risk factors which would indicate the need for higher level of care including thoughts to harm self or others and/or self-harming behavior and encouraged patient to contact this office, call 911, or present to the nearest emergency room should any of these events occur. Discussed crisis intervention services and means to access. Patient adamantly and convincingly denies current suicidal or homicidal ideation or perceptual disturbance.    Assessment Scores:   PHQ-9 Total Score:    CARLOS EDUARDO-7 Score:    PTSD Total Score: .PTSDTOTALSCORE    (Scales based on 0 - 10 with 10 being the worst)  Depression: 7 Anxiety 7       Mental Status Exam:   Hygiene:   good  Cooperation:   Cooperative  Eye Contact:  Good  Psychomotor Behavior:  Restless  Affect:  Appropriate  Mood: anxious  Speech:  Normal  Thought Process:  Linear  Thought Content:  Normal  Suicidal:  None  Homicidal:  None  Hallucinations:  None  Delusion:  None  Memory:  Intact  Orientation:  Person, Place, Time, and Situation  Reliability:  fair  Insight:  Fair  Judgement:  Fair  Impulse Control:  Fair  Physical/Medical Issues:  No      Patient's Support Network Includes:      Functional Status: Moderate impairment     Progress toward goal: At goal    Prognosis: Good with Ongoing Treatment     Medications:     Current Outpatient Medications:     Azelastine-Fluticasone 137-50 MCG/ACT suspension, , Disp: , Rfl:     desvenlafaxine (Pristiq) 100 MG 24 hr tablet, Take 1 tablet by mouth Daily., Disp: 30 tablet, Rfl: 2    fenofibrate (FENOGLIDE) 40 MG tablet, , Disp: , Rfl:     Gabapentin Enacarbil ER (Horizant) 300 MG tablet controlled-release, , Disp: , Rfl:     latanoprost (XALATAN) 0.005 % ophthalmic solution, , Disp: , Rfl:     lisinopril (PRINIVIL,ZESTRIL) 5 MG tablet, , Disp: , Rfl:     Loratadine 10 MG capsule, , Disp: , Rfl:     montelukast (SINGULAIR) 10 MG tablet, , Disp: , Rfl:     omeprazole (priLOSEC) 20 MG capsule, , Disp: , Rfl:     pravastatin (PRAVACHOL) 40 MG tablet, , Disp: , Rfl:     Visit Diagnosis/Orders Placed This Visit:    ICD-10-CM ICD-9-CM   1. Mixed obsessional thoughts and acts  F42.2 300.3        PLAN:  Safety: No acute safety concerns  Risk Assessment: Risk of self-harm acutely is low. Risk of self-harm chronically is also low, but could be further elevated in the event of treatment noncompliance and/or AODA.    Crisis Plan:  Symptoms and/or behaviors to indicate a crisis: Excessive worry or fear, Feeling sad or low, Prolonged irritability or anger, and Self-doubt    What calming techniques or other strategies will patient use to de-escalate and stay safe: slow down, breathe, visualize calming self,  think it though, listen to music, change focus, take a walk    Who is one person patient can contact to assist with de-escalation?  Significant other and friends    Treatment Plan/Goals: Patient will continue supportive psychotherapy efforts and medication regimen as prescribed. Therapist will provide EMDR Therapy to assist patient in improving functioning and gaining coping skills, maintaining stability, and avoiding decompensation and the need for higher level of care. Plan for treatment was discussed during today's visit. Patient acknowledged and verbally consented to continue with current treatment plan and was educated on the importance of compliance with treatment and follow-up appointments.     Patient will contact this office, call 911 or present to the nearest emergency room should suicidal or homicidal ideations occur.     Follow Up:   No follow-ups on file.      BETTNIA Rubio   [facility] [place]     This document has been electronically signed by BETTINA Rubio   April 10, 2025 13:29 EDT

## 2025-04-16 ENCOUNTER — TELEMEDICINE (OUTPATIENT)
Dept: PSYCHIATRY | Facility: CLINIC | Age: 55
End: 2025-04-16
Payer: COMMERCIAL

## 2025-04-16 DIAGNOSIS — F41.1 GENERALIZED ANXIETY DISORDER: Primary | ICD-10-CM

## 2025-04-16 DIAGNOSIS — F33.1 MODERATE EPISODE OF RECURRENT MAJOR DEPRESSIVE DISORDER: ICD-10-CM

## 2025-04-16 NOTE — PROGRESS NOTES
Mode of Visit: Video   Location of patient: -HOME-   Location of provider: +HOME+   You have chosen to receive care through a telehealth visit.   The patient has signed the video visit consent form.   The visit included audio and video interaction. No technical issues occurred during this visit.     Ji Hernandez is a 54 y.o. male who presents today for follow up    Chief Complaint:  Anxiety, depression and poor concentration     History of Present Illness:   History of Present Illness  Aruna Hernandez presents for medication management follow up via [facility] video visit. He was last seen for follow up on 1/10/25. Currently taking Pristiq 100 mg daily. Voices that he has experienced mood swings. This was recently discussed during therapy and feels that some of the details provided may have been misunderstood. Voices that he had a couple episodes when mood changed, once when significant other was home and once when alone. Has been under more stress, currently gathering information for an audit that has been difficult. Has also been dealing with medical issues associated with parathyroid and elevated BP.   Symptoms of anxiety present include feeling anxious, nervous, on edge, worry, trouble relaxing, restlessness, easily annoyed/irritable and feelign afraid something awful might happen.   Symptoms of depression include low energy, little interest or pleasure in doing things, feeling down/depressed/hopeless, poor concentration and sometimes feeling bad about self.   Denies SI/HI. PHQ-9 total score: 19, CARLOS EDUARDO-7 total score: 13.     Previous Psych Meds: Zoloft (10 years), Paxil (x4 years), Wellbutrin (anger and irritability), Qelbree (ineffective), Adderall XR, Vyvanse, Mydayis    Depression  Presents for follow-up visit. Symptoms include excessive worry, insomnia, irritability, nervousness/anxiety, obsessions, malaise/fatigue and difficulty controlling mood. Patient is not experiencing: dry mouth,  hypersomnia, shortness of breath, suicidal ideas, weight gain, weight loss and chest pain.Symptoms occur constantly.  The severity of symptoms is moderate.  The patient sleeps 6 hours per night. His past medical history is significant for depression. Compliance with medications is %.      The following portions of the patient's history were reviewed and updated as appropriate: allergies, current medications, past family history, past medical history, past social history, past surgical history and problem list.    Past Medical History:   Diagnosis Date    ADHD (attention deficit hyperactivity disorder) 2/28/2023    I have been seeing Dyllan Campbell a local therapist who brought it to my attention.  He did not diagnose, he could not do that.    Depression September 1993    Depressive disorder 03/20/2018     Social History     Socioeconomic History    Marital status:    Tobacco Use    Smoking status: Never     Passive exposure: Never    Smokeless tobacco: Never   Vaping Use    Vaping status: Never Used   Substance and Sexual Activity    Alcohol use: Not Currently     Comment: Binge drinker.  No longer drink.    Drug use: Never    Sexual activity: Yes     Partners: Male     Birth control/protection: Condom, Partner of same sex     Family History   Problem Relation Age of Onset    Depression Mother         Off and on throughout life.    ADD / ADHD Father         Bio-Dad, never known him. Step dad raised me.    Alcohol abuse Father     Depression Father     Alcohol abuse Maternal Grandfather         Passed away 11/1986    Alcohol abuse Sister         Half sister    Depression Sister     Drug abuse Sister     Paranoid behavior Sister      Past Surgical History:   Procedure Laterality Date    ABDOMINAL SURGERY  2018    Gallbladder removed    COLONOSCOPY  2022    ENDOSCOPY  2021    HERNIA REPAIR  1972    TONSILLECTOMY  2007     Patient Active Problem List   Diagnosis    Seasonal allergic rhinitis    Restless  legs    Overweight    Nausea    Mild intermittent asthma    Inactive tuberculosis    Hyperlipidemia    Depressive disorder    Change in weight     Allergies   Allergen Reactions    Azithromycin Unknown - High Severity    Ibuprofen Unknown - High Severity     Current Outpatient Medications   Medication Sig Dispense Refill    Azelastine-Fluticasone 137-50 MCG/ACT suspension       desvenlafaxine (Pristiq) 100 MG 24 hr tablet Take 1 tablet by mouth Daily. 30 tablet 2    fenofibrate (FENOGLIDE) 40 MG tablet       Gabapentin Enacarbil ER (Horizant) 300 MG tablet controlled-release       latanoprost (XALATAN) 0.005 % ophthalmic solution       lisinopril (PRINIVIL,ZESTRIL) 5 MG tablet       Loratadine 10 MG capsule       montelukast (SINGULAIR) 10 MG tablet       omeprazole (priLOSEC) 20 MG capsule       pravastatin (PRAVACHOL) 40 MG tablet        No current facility-administered medications for this visit.     Review of Systems   Constitutional:  Positive for irritability and malaise/fatigue. Negative for activity change, appetite change, unexpected weight gain and unexpected weight loss.   Respiratory:  Negative for shortness of breath.    Cardiovascular:  Negative for chest pain.   Psychiatric/Behavioral:  Positive for dysphoric mood and sleep disturbance. Negative for suicidal ideas. The patient is nervous/anxious and has insomnia.      Physical Exam  Constitutional:       General: He is not in acute distress.     Appearance: Normal appearance.   Neurological:      Mental Status: He is alert.       Vitals:   The patient was seen remotely today via a MyChart Video Visit through [facility]. Unable to obtain vital signs due to nature of remote visit.    Mental Status Exam:   Hygiene:    appears good  Cooperation:  Cooperative  Eye Contact:   REBEKAH r/t video visit  Psychomotor Behavior:  Appropriate  Affect:  Full range and Appropriate  Mood: normal  Hopelessness: Denies  Speech:   Talkative  Thought Process:  Goal directed and  Linear  Thought Content:  Mood congruent  Suicidal:  None  Homicidal:  None  Hallucinations:  None  Delusion:  None  Memory:  Intact  Orientation:  Person, Place, Time, and Situation  Reliability:  good  Insight:  Good  Judgement:  Good  Impulse Control:  Good    Assessment & Plan   Problems Addressed this Visit    None  Visit Diagnoses         Generalized anxiety disorder    -  Primary      Moderate episode of recurrent major depressive disorder              Diagnoses         Codes Comments      Generalized anxiety disorder    -  Primary ICD-10-CM: F41.1  ICD-9-CM: 300.02       Moderate episode of recurrent major depressive disorder     ICD-10-CM: F33.1  ICD-9-CM: 296.32           Visit Diagnoses:    ICD-10-CM ICD-9-CM   1. Generalized anxiety disorder  F41.1 300.02   2. Moderate episode of recurrent major depressive disorder  F33.1 296.32     Aruna presents for medication management follow-up.  Previously discussed initiating mood stabilizer and felt that he would benefit from this type of medication.  Voices that he would like to continue to think about this option.  Has struggled with some mood fluctuations that could possibly be attributed to situational stressors.  Will continue with current medication regimen of Pristiq 100 mg daily and consider mood stabilizer if mood fluctuations persist or worsen.  [facility]    -Continue Pristiq 100 mg daily     -Reviewed previous available documentation and most recent available labs. AMAURI reviewed and is appropriate.    GOALS:  Short Term Goals: Patient will be compliant with medication, and patient will have no significant medication related side effects.  Patient will be engaged in psychotherapy as indicated.  Patient will report subjective improvement of symptoms.  Long term goals: To stabilize mood and treat/improve subjective symptoms, the patient will stay out of the hospital, the patient will be at an optimal  level of functioning, and the patient will take all medications as prescribed.  The patient/guardian verbalized understanding and agreement with goals that were mutually set.    TREATMENT PLAN: Continue supportive psychotherapy efforts and medications as indicated for patient's diagnosis.  Pharmacological and Non-Pharmacological treatment options discussed during today's visit. Patient/Guardian acknowledged and verbally consented with current treatment plan and was educated on the importance of compliance with treatment and follow-up appointments.      MEDICATION ISSUES:  Discussed medication options and treatment plan of prescribed medication as well as the risks, benefits, any black box warnings, and side effects including potential falls, possible impaired driving, and metabolic adversities among others. Patient is agreeable to call the office with any worsening of symptoms or onset of side effects, or if any concerns or questions arise.  The contact information for the office is made available to the patient. Patient is agreeable to call 911 or go to the nearest ER should they begin having any SI/HI, or if any urgent concerns arise. No medication side effects or related complaints today.     MEDS ORDERED DURING VISIT:  No orders of the defined types were placed in this encounter.      FOLLOW UP:  Return in about 3 months (around 7/16/2025) for Recheck.             This document has been electronically signed by CECILIA Walters  April 30, 2025 00:53 EDT    Please note that portions of this note were completed with a voice recognition program. Efforts were made to edit dictation, but occasionally words are mistranscribed.

## 2025-04-24 ENCOUNTER — OFFICE VISIT (OUTPATIENT)
Dept: PSYCHIATRY | Facility: CLINIC | Age: 55
End: 2025-04-24
Payer: COMMERCIAL

## 2025-04-24 DIAGNOSIS — F42.2 MIXED OBSESSIONAL THOUGHTS AND ACTS: Primary | ICD-10-CM

## 2025-04-24 PROCEDURE — 90837 PSYTX W PT 60 MINUTES: CPT | Performed by: COUNSELOR

## 2025-04-24 NOTE — PROGRESS NOTES
Date:2025   Patient Name: Aruna Hernandez  : 1970   MRN: [id number]   Time IN: 1:30    Time OUT: 2:30     Referring Provider: Amy Conrad MD    PROGRESS NOTE    History of Present Illness:   Aruna Hernandez is a 54 y.o. male who is being seen today for follow up individual Psychotherapy session.     Chief Complaint:  Patient arrived at the [facility].  Patient expressed difficulties with feelings of overwhelmed, impulsivity, feelings of on edge, repetitive actions, compulsions, difficulty concentrating, excessive worry, and feeling down and out.    ICD-10-CM ICD-9-CM   1. Mixed obsessional thoughts and acts  F42.2 300.3        Clinical Maneuvering/Intervention: CBT technique to assist with OCD    Assisted patient in processing above session content; acknowledged and normalized patient’s thoughts, feelings, and concerns to build appropriate rapport and a positive therapeutic relationship with open and honest communication.  Rationalized patient thought process regarding OCD.  Discussed triggers associated with patient's Behaviors/thoughts.  Also discussed coping skills for patient to implement such as spending time with friends, writing, and mindful breathing.    Allowed patient to freely discuss issues without interruption or judgment. Provided safe, confidential environment to facilitate the development of positive therapeutic relationship and encourage open, honest communication. Assisted patient in identifying risk factors which would indicate the need for higher level of care including thoughts to harm self or others and/or self-harming behavior and encouraged patient to contact this office, call 911, or present to the nearest emergency room should any of these events occur. Discussed crisis intervention services and means to access. Patient adamantly and convincingly denies current suicidal or homicidal ideation or perceptual disturbance.    Assessment Scores:   PHQ-9 Total Score:      CARLOS EDUARDO-7 Score:    PTSD Total Score: .PTSDTOTALSCORE    (Scales based on 0 - 10 with 10 being the worst)  Depression: 7 Anxiety 7       Mental Status Exam:   Hygiene:   good  Cooperation:  Cooperative  Eye Contact:  Good  Psychomotor Behavior:  Restless  Affect:  Appropriate  Mood: anxious  Speech:  Normal  Thought Process:  Linear  Thought Content:  Normal  Suicidal:  None  Homicidal:  None  Hallucinations:  None  Delusion:  None  Memory:  Intact  Orientation:  Person, Place, Time, and Situation  Reliability:  good  Insight:  Fair  Judgement:  Fair  Impulse Control:  Fair  Physical/Medical Issues:  Yes patient identified current concerns with parathyroid diagnosis      Patient's Support Network Includes:  significant other    Functional Status: Moderate impairment     Progress toward goal: At goal    Prognosis: Good with Ongoing Treatment     Medications:     Current Outpatient Medications:     Azelastine-Fluticasone 137-50 MCG/ACT suspension, , Disp: , Rfl:     desvenlafaxine (Pristiq) 100 MG 24 hr tablet, Take 1 tablet by mouth Daily., Disp: 30 tablet, Rfl: 2    fenofibrate (FENOGLIDE) 40 MG tablet, , Disp: , Rfl:     Gabapentin Enacarbil ER (Horizant) 300 MG tablet controlled-release, , Disp: , Rfl:     latanoprost (XALATAN) 0.005 % ophthalmic solution, , Disp: , Rfl:     lisinopril (PRINIVIL,ZESTRIL) 5 MG tablet, , Disp: , Rfl:     Loratadine 10 MG capsule, , Disp: , Rfl:     montelukast (SINGULAIR) 10 MG tablet, , Disp: , Rfl:     omeprazole (priLOSEC) 20 MG capsule, , Disp: , Rfl:     pravastatin (PRAVACHOL) 40 MG tablet, , Disp: , Rfl:     Visit Diagnosis/Orders Placed This Visit:    ICD-10-CM ICD-9-CM   1. Mixed obsessional thoughts and acts  F42.2 300.3        PLAN:  Safety: No acute safety concerns  Risk Assessment: Risk of self-harm acutely is low. Risk of self-harm chronically is also low, but could be further elevated in the event of treatment noncompliance and/or AODA.    Crisis Plan:  Symptoms and/or  behaviors to indicate a crisis: Excessive worry or fear, Feeling sad or low, Prolonged irritability or anger, Isolation, Lack of sleep, and Self-doubt    What calming techniques or other strategies will patient use to de-escalate and stay safe: slow down, breathe, visualize calming self, think it though, listen to music, change focus, take a walk    Who is one person patient can contact to assist with de-escalation?      Treatment Plan/Goals: Patient will continue supportive psychotherapy efforts and medication regimen as prescribed. Therapist will provide Cognitive Behavioral Therapy to assist patient in improving functioning and gaining coping skills, maintaining stability, and avoiding decompensation and the need for higher level of care. Plan for treatment was discussed during today's visit. Patient acknowledged and verbally consented to continue with current treatment plan and was educated on the importance of compliance with treatment and follow-up appointments.     Patient will contact this office, call 911 or present to the nearest emergency room should suicidal or homicidal ideations occur.     Follow Up:   No follow-ups on file.      BETTINA Rubio   [facility]     This document has been electronically signed by BETTINA Rubio   April 24, 2025 13:29 EDT

## 2025-05-08 ENCOUNTER — OFFICE VISIT (OUTPATIENT)
Dept: PSYCHIATRY | Facility: CLINIC | Age: 55
End: 2025-05-08
Payer: COMMERCIAL

## 2025-05-08 DIAGNOSIS — F42.2 MIXED OBSESSIONAL THOUGHTS AND ACTS: Primary | ICD-10-CM

## 2025-05-08 PROCEDURE — 90853 GROUP PSYCHOTHERAPY: CPT | Performed by: COUNSELOR

## 2025-05-08 NOTE — PROGRESS NOTES
Date:2025   Patient Name: Aruna Hernandez  : 1970   MRN: [id number]   Time IN: 1:30    Time OUT: 2:24     Referring Provider: Amy Conrad MD    PROGRESS NOTE    History of Present Illness:   Aruna Hernandez is a 54 y.o. male who is being seen today for follow up individual Psychotherapy session.     Chief Complaint:  Patient arrived to the [facility] for a follow up appointment. Patient expressed difficulties with feelings of on edge, compulsions, repetitive actions, difficulty concentrating, excessive worry, mood regulation, overwhelm, feeling down and out, impulsivity, and difficulties with sleep.    ICD-10-CM ICD-9-CM   1. Mixed obsessional thoughts and acts  F42.2 300.3        Clinical Maneuvering/Intervention: CBT techniques to assist with OCD    Assisted patient in processing above session content; acknowledged and normalized patient’s thoughts, feelings, and concerns to build appropriate rapport and a positive therapeutic relationship with open and honest communication.  Rationalized patient thought process regarding OCD.  Discussed triggers associated with patient's behaviors/thoughts.  Also discussed coping skills for patient to implement such as taking breaks, breathing techniques, and finding a new hobby.    Allowed patient to freely discuss issues without interruption or judgment. Provided safe, confidential environment to facilitate the development of positive therapeutic relationship and encourage open, honest communication. Assisted patient in identifying risk factors which would indicate the need for higher level of care including thoughts to harm self or others and/or self-harming behavior and encouraged patient to contact this office, call 911, or present to the nearest emergency room should any of these events occur. Discussed crisis intervention services and means to access. Patient adamantly and convincingly denies current suicidal or homicidal ideation or perceptual  disturbance.    Assessment Scores:   PHQ-9 Total Score:     CARLOS EDUARDO-7 Score:    PTSD Total Score: .PTSDTOTALSCORE    (Scales based on 0 - 10 with 10 being the worst)  Depression: 7 Anxiety 7       Mental Status Exam:   Hygiene:   good  Cooperation:  Cooperative  Eye Contact:  Good  Psychomotor Behavior:  Restless  Affect:  Appropriate  Mood: anxious  Speech:  Normal  Thought Process:  Goal directed and Linear  Thought Content:  Normal  Suicidal:  None  Homicidal:  None  Hallucinations:  None  Delusion:  None  Memory:  Intact  Orientation:  Person, Place, Time, and Situation  Reliability:  good  Insight:  Fair  Judgement:  Fair  Impulse Control:  Fair  Physical/Medical Issues:  No      Patient's Support Network Includes:  significant other    Functional Status: Moderate impairment     Progress toward goal: At goal    Prognosis: Good with Ongoing Treatment     Medications:     Current Outpatient Medications:     Azelastine-Fluticasone 137-50 MCG/ACT suspension, , Disp: , Rfl:     desvenlafaxine (Pristiq) 100 MG 24 hr tablet, Take 1 tablet by mouth Daily., Disp: 30 tablet, Rfl: 2    fenofibrate (FENOGLIDE) 40 MG tablet, , Disp: , Rfl:     Gabapentin Enacarbil ER (Horizant) 300 MG tablet controlled-release, , Disp: , Rfl:     latanoprost (XALATAN) 0.005 % ophthalmic solution, , Disp: , Rfl:     lisinopril (PRINIVIL,ZESTRIL) 5 MG tablet, , Disp: , Rfl:     Loratadine 10 MG capsule, , Disp: , Rfl:     montelukast (SINGULAIR) 10 MG tablet, , Disp: , Rfl:     omeprazole (priLOSEC) 20 MG capsule, , Disp: , Rfl:     pravastatin (PRAVACHOL) 40 MG tablet, , Disp: , Rfl:     Visit Diagnosis/Orders Placed This Visit:    ICD-10-CM ICD-9-CM   1. Mixed obsessional thoughts and acts  F42.2 300.3        PLAN:  Safety: No acute safety concerns  Risk Assessment: Risk of self-harm acutely is low. Risk of self-harm chronically is also low, but could be further elevated in the event of treatment noncompliance and/or AODA.    Crisis  Plan:  Symptoms and/or behaviors to indicate a crisis: Excessive worry or fear, Feeling sad or low, Prolonged irritability or anger, Lack of sleep, and Self-doubt    What calming techniques or other strategies will patient use to de-escalate and stay safe: slow down, breathe, visualize calming self, think it though, listen to music, change focus, take a walk    Who is one person patient can contact to assist with de-escalation? Significant other and friends    Treatment Plan/Goals: Patient will continue supportive psychotherapy efforts and medication regimen as prescribed. Therapist will provide Cognitive Behavioral Therapy to assist patient in improving functioning and gaining coping skills, maintaining stability, and avoiding decompensation and the need for higher level of care. Plan for treatment was discussed during today's visit. Patient acknowledged and verbally consented to continue with current treatment plan and was educated on the importance of compliance with treatment and follow-up appointments.     Patient will contact this office, call 911 or present to the nearest emergency room should suicidal or homicidal ideations occur.     Follow Up:   No follow-ups on file.      BETTINA Rubio   [facility]     This document has been electronically signed by BETTINA Rubio   May 8, 2025 13:31 EDT

## 2025-05-19 DIAGNOSIS — F33.1 MODERATE EPISODE OF RECURRENT MAJOR DEPRESSIVE DISORDER: ICD-10-CM

## 2025-05-19 DIAGNOSIS — F41.1 GENERALIZED ANXIETY DISORDER: ICD-10-CM

## 2025-05-19 RX ORDER — DESVENLAFAXINE 100 MG/1
100 TABLET, EXTENDED RELEASE ORAL DAILY
Qty: 30 TABLET | Refills: 2 | Status: SHIPPED | OUTPATIENT
Start: 2025-05-19

## 2025-05-22 ENCOUNTER — OFFICE VISIT (OUTPATIENT)
Dept: PSYCHIATRY | Facility: CLINIC | Age: 55
End: 2025-05-22
Payer: COMMERCIAL

## 2025-05-22 DIAGNOSIS — F42.2 MIXED OBSESSIONAL THOUGHTS AND ACTS: Primary | ICD-10-CM

## 2025-05-22 PROCEDURE — 90837 PSYTX W PT 60 MINUTES: CPT | Performed by: COUNSELOR

## 2025-05-22 NOTE — PROGRESS NOTES
Date:2025   Patient Name: Aruna Hernandez  : 1970   MRN: [id number]   Time IN: 1:31    Time OUT: 2:24     Referring Provider: Amy Conrad MD    PROGRESS NOTE    History of Present Illness:   Aruna Hernandez is a 54 y.o. male who is being seen today for follow up individual Psychotherapy session.     Chief Complaint: Patient arrived at the [facility] for a follow-up appointment. Patient expressed difficulties with feelings of on edge, compulsions, repetitive actions, difficulty concentrating, excessive worry, mood regulation, overwhelm, feeling down and out, impulsivity, and difficulties with sleep.     ICD-10-CM ICD-9-CM   1. Mixed obsessional thoughts and acts  F42.2 300.3        Clinical Maneuvering/Intervention: CBT techniques to assist with OCD    Assisted patient in processing above session content; acknowledged and normalized patient’s thoughts, feelings, and concerns to build appropriate rapport and a positive therapeutic relationship with open and honest communication.  Rationalized patient thought process regarding thoughts and behavior.  Discussed triggers associated with patient's thoughts and behaviors.  Also discussed coping skills for patient to implement such as on for breathing, journaling, poetry, and taking breaks.    Allowed patient to freely discuss issues without interruption or judgment. Provided safe, confidential environment to facilitate the development of positive therapeutic relationship and encourage open, honest communication. Assisted patient in identifying risk factors which would indicate the need for higher level of care including thoughts to harm self or others and/or self-harming behavior and encouraged patient to contact this office, call 911, or present to the nearest emergency room should any of these events occur. Discussed crisis intervention services and means to access. Patient adamantly and convincingly denies current suicidal or homicidal  ideation or perceptual disturbance.    Assessment Scores:   PHQ-9 Total Score:     CARLOS EDUARDO-7 Score:    PTSD Total Score: .PTSDTOTALSCORE    (Scales based on 0 - 10 with 10 being the worst)  Depression: 2 Anxiety 2       Mental Status Exam:   Hygiene:   good  Cooperation:  Cooperative  Eye Contact:  Good  Psychomotor Behavior:  Appropriate  Affect:  Appropriate  Mood: normal  Speech:  Normal  Thought Process:  Linear  Thought Content:  Mood congruent  Suicidal:  None  Homicidal:  None  Hallucinations:  None  Delusion:  None  Memory:  Intact  Orientation:  Person, Place, Time, and Situation  Reliability:  good  Insight:  Good  Judgement:  Good  Impulse Control:  Fair  Physical/Medical Issues:  No      Patient's Support Network Includes:      Functional Status: Moderate impairment     Progress toward goal: At goal    Prognosis: Good with Ongoing Treatment     Medications:     Current Outpatient Medications:     Azelastine-Fluticasone 137-50 MCG/ACT suspension, , Disp: , Rfl:     desvenlafaxine (Pristiq) 100 MG 24 hr tablet, Take 1 tablet by mouth Daily., Disp: 30 tablet, Rfl: 2    fenofibrate (FENOGLIDE) 40 MG tablet, , Disp: , Rfl:     Gabapentin Enacarbil ER (Horizant) 300 MG tablet controlled-release, , Disp: , Rfl:     latanoprost (XALATAN) 0.005 % ophthalmic solution, , Disp: , Rfl:     lisinopril (PRINIVIL,ZESTRIL) 5 MG tablet, , Disp: , Rfl:     Loratadine 10 MG capsule, , Disp: , Rfl:     montelukast (SINGULAIR) 10 MG tablet, , Disp: , Rfl:     omeprazole (priLOSEC) 20 MG capsule, , Disp: , Rfl:     pravastatin (PRAVACHOL) 40 MG tablet, , Disp: , Rfl:     Visit Diagnosis/Orders Placed This Visit:    ICD-10-CM ICD-9-CM   1. Mixed obsessional thoughts and acts  F42.2 300.3        PLAN:  Safety: No acute safety concerns  Risk Assessment: Risk of self-harm acutely is low. Risk of self-harm chronically is also low, but could be further elevated in the event of treatment noncompliance and/or AODA.    Crisis  Plan:  Symptoms and/or behaviors to indicate a crisis: Excessive worry or fear, Feeling sad or low, Isolation, and Self-doubt    What calming techniques or other strategies will patient use to de-escalate and stay safe: slow down, breathe, visualize calming self, think it though, listen to music, change focus, take a walk    Who is one person patient can contact to assist with de-escalation?   and friends    Treatment Plan/Goals: Patient will continue supportive psychotherapy efforts and medication regimen as prescribed. Therapist will provide Cognitive Behavioral Therapy to assist patient in improving functioning and gaining coping skills, maintaining stability, and avoiding decompensation and the need for higher level of care. Plan for treatment was discussed during today's visit. Patient acknowledged and verbally consented to continue with current treatment plan and was educated on the importance of compliance with treatment and follow-up appointments.     Patient will contact this office, call 911 or present to the nearest emergency room should suicidal or homicidal ideations occur.     Follow Up:   No follow-ups on file.      BETTINA Rubio   [facility]     This document has been electronically signed by BETTINA Rubio   May 22, 2025 13:31 EDT

## 2025-06-05 ENCOUNTER — OFFICE VISIT (OUTPATIENT)
Dept: PSYCHIATRY | Facility: CLINIC | Age: 55
End: 2025-06-05
Payer: COMMERCIAL

## 2025-06-05 DIAGNOSIS — F42.2 MIXED OBSESSIONAL THOUGHTS AND ACTS: Primary | ICD-10-CM

## 2025-06-05 PROCEDURE — 90837 PSYTX W PT 60 MINUTES: CPT | Performed by: COUNSELOR

## 2025-06-05 NOTE — TREATMENT PLAN
Multi-Disciplinary Problems (from Behavioral Health Treatment Plan)      Active Problems       Problem: Adjustment  Start Date: 06/05/25      Problem Details: The patient self-scales this problem as a 7 with 10 being the worst.          Goal Priority Start Date Expected End Date End Date    Patient will implement healthy coping strategies. -- 06/05/25 12/04/25 --    Goal Details: Progress toward goal:  The patient self-scales their progress related to this goal as a 4 with 10 being the worst.        Goal Intervention Frequency Start Date End Date    Assist patient to identify and develop healthy coping strategies Weekly 06/05/25 --    Intervention Details: Duration of treatment until remission of symptoms.                Problem: Anger  Start Date: 06/05/25      Problem Details: The patient self-scales this problem as a 3 with 10 being the worst.          Goal Priority Start Date Expected End Date End Date    Patient will develop specific, socially acceptable way to manage anger. -- 06/05/25 12/04/25 --    Goal Details: Progress toward goal:  The patient self-scales their progress related to this goal as a 5 with 10 being the worst.        Goal Intervention Frequency Start Date End Date    Process patient's angry feelings or outbursts that have recently occurred and review alternative behaviors Weekly 06/05/25 --    Intervention Details: Duration of treatment until remission of symptoms.        Goal Intervention Frequency Start Date End Date    Work with patient to develop constructive way to handle anger. Weekly 06/05/25 --    Intervention Details: Duration of treatment until remission of symptoms.                Problem: Anxiety  Start Date: 06/05/25      Problem Details: The patient self-scales this problem as a 7 with 10 being the worst.          Goal Priority Start Date Expected End Date End Date    Patient will develop and implement behavioral and cognitive strategies to reduce anxiety and irrational fears. --  06/05/25 12/04/25 --    Goal Details: Progress toward goal:  The patient self-scales their progress related to this goal as a 6 with 10 being the worst.        Goal Intervention Frequency Start Date End Date    Help patient explore past emotional issues in relation to present anxiety. Weekly 06/05/25 --    Intervention Details: Duration of treatment until remission of symptoms.        Goal Intervention Frequency Start Date End Date    Help patient develop an awareness of their cognitive and physical responses to anxiety. Weekly 06/05/25 --    Intervention Details: Duration of treatment until remission of symptoms.                Problem: Depression  Start Date: 06/05/25      Problem Details: The patient self-scales this problem as a 7 with 10 being the worst.          Goal Priority Start Date Expected End Date End Date    Patient will demonstrate the ability to initiate new constructive life skills outside of sessions on a consistent basis. -- 06/05/25 12/04/25 --    Goal Details: Progress toward goal:  The patient self-scales their progress related to this goal as a 7 with 10 being the worst.        Goal Intervention Frequency Start Date End Date    Assist patient in setting attainable activities of daily living goals. PRN 06/05/25 --      Goal Intervention Frequency Start Date End Date    Provide education about depression Weekly 06/05/25 --    Intervention Details: Duration of treatment until remission of symptoms.        Goal Intervention Frequency Start Date End Date    Assist patient in developing healthy coping strategies. Weekly 06/05/25 --    Intervention Details: Duration of treatment until remission of symptoms.                Problem: Mood Instability  Start Date: 06/05/25      Problem Details: The patient self-scales this problem as a 6 with 10 being the worst.          Goal Priority Start Date Expected End Date End Date    Patient will achieve mood stability as evidenced by controlled behavior and a  more deliberate thought process -- 06/05/25 12/04/25 --    Goal Details: Progress toward goal:  The patient self-scales their progress related to this goal as a 3 with 10 being the worst.        Goal Intervention Frequency Start Date End Date    Provide structure and focus to patient's thoughts and actions by establishing plans and routine. Weekly 06/05/25 --    Intervention Details: Duration of treatment until remission of symptoms.        Goal Intervention Frequency Start Date End Date    Assist patient in setting responsible goals and limits in behavior. Weekly 06/05/25 --    Intervention Details: Duration of treatment until remission of symptoms.                        Reviewed By       Charles Moreira, [facility] 06/05/25 [id number]                     I have discussed and reviewed this treatment plan with the patient.

## 2025-06-05 NOTE — PROGRESS NOTES
Date:2025   Patient Name: Aruna Hernandez  : 1970   MRN: [id number]   Time IN: 1:20    Time OUT: 2:22     Referring Provider: Amy Conrad MD    PROGRESS NOTE    History of Present Illness:   Aruna Hernandez is a 54 y.o. male who is being seen today for follow up individual Psychotherapy session.     Chief Complaint: Patient arrived at the [facility] for a follow-up appointment.  Patient expressed concerns with compulsions, feelings of on edge, overwhelmed, excessive worry, intrusive thoughts, feeling down and out, difficulty sleeping, mood regulation, and focusing.    ICD-10-CM ICD-9-CM   1. Mixed obsessional thoughts and acts  F42.2 300.3        Clinical Maneuvering/Intervention: CBT techniques to assist with OCD    Assisted patient in processing above session content; acknowledged and normalized patient’s thoughts, feelings, and concerns to build appropriate rapport and a positive therapeutic relationship with open and honest communication.  Rationalized patient thought process regarding thoughts/behaviors.  Discussed triggers associated with patient's thoughts/behaviors.  Also discussed coping skills for patient to implement such as breathing techniques, taking breaks, and talking to someone he trusts.    Allowed patient to freely discuss issues without interruption or judgment. Provided safe, confidential environment to facilitate the development of positive therapeutic relationship and encourage open, honest communication. Assisted patient in identifying risk factors which would indicate the need for higher level of care including thoughts to harm self or others and/or self-harming behavior and encouraged patient to contact this office, call 911, or present to the nearest emergency room should any of these events occur. Discussed crisis intervention services and means to access. Patient adamantly and convincingly denies current suicidal or homicidal ideation or perceptual  disturbance.    Assessment Scores:   PHQ-9 Total Score:     CARLOS EDUARDO-7 Score:    PTSD Total Score: .PTSDTOTALSCORE    (Scales based on 0 - 10 with 10 being the worst)  Depression: 7 Anxiety 7       Mental Status Exam:   Hygiene:   good  Cooperation:  Cooperative  Eye Contact:  Good  Psychomotor Behavior:  Restless  Affect:  Appropriate  Mood: anxious  Speech:  Normal  Thought Process:  Linear  Thought Content:  Normal  Suicidal:  None  Homicidal:  None  Hallucinations:  None  Delusion:  None  Memory:  Intact  Orientation:  Person, Place, Time, and Situation  Reliability:  good  Insight:  Good  Judgement:  Fair  Impulse Control:  Fair  Physical/Medical Issues:  No      Patient's Support Network Includes:  significant other    Functional Status: Moderate impairment     Progress toward goal: At goal    Prognosis: Good with Ongoing Treatment     Medications:     Current Outpatient Medications:     Azelastine-Fluticasone 137-50 MCG/ACT suspension, , Disp: , Rfl:     desvenlafaxine (Pristiq) 100 MG 24 hr tablet, Take 1 tablet by mouth Daily., Disp: 30 tablet, Rfl: 2    fenofibrate (FENOGLIDE) 40 MG tablet, , Disp: , Rfl:     Gabapentin Enacarbil ER (Horizant) 300 MG tablet controlled-release, , Disp: , Rfl:     latanoprost (XALATAN) 0.005 % ophthalmic solution, , Disp: , Rfl:     lisinopril (PRINIVIL,ZESTRIL) 5 MG tablet, , Disp: , Rfl:     Loratadine 10 MG capsule, , Disp: , Rfl:     montelukast (SINGULAIR) 10 MG tablet, , Disp: , Rfl:     omeprazole (priLOSEC) 20 MG capsule, , Disp: , Rfl:     pravastatin (PRAVACHOL) 40 MG tablet, , Disp: , Rfl:     Visit Diagnosis/Orders Placed This Visit:    ICD-10-CM ICD-9-CM   1. Mixed obsessional thoughts and acts  F42.2 300.3        PLAN:  Safety: No acute safety concerns  Risk Assessment: Risk of self-harm acutely is low. Risk of self-harm chronically is also low, but could be further elevated in the event of treatment noncompliance and/or AODA.    Crisis Plan:  Symptoms and/or  behaviors to indicate a crisis: Excessive worry or fear, Feeling sad or low, Prolonged irritability or anger, Isolation, Lack of sleep, and Self-doubt    What calming techniques or other strategies will patient use to de-escalate and stay safe: slow down, breathe, visualize calming self, think it though, listen to music, change focus, take a walk    Who is one person patient can contact to assist with de-escalation?  Significant other    Treatment Plan/Goals: Patient will continue supportive psychotherapy efforts and medication regimen as prescribed. Therapist will provide Cognitive Behavioral Therapy to assist patient in improving functioning and gaining coping skills, maintaining stability, and avoiding decompensation and the need for higher level of care. Plan for treatment was discussed during today's visit. Patient acknowledged and verbally consented to continue with current treatment plan and was educated on the importance of compliance with treatment and follow-up appointments.     Patient will contact this office, call 911 or present to the nearest emergency room should suicidal or homicidal ideations occur.     Follow Up:   No follow-ups on file.      BETTINA Rubio   [facility]     This document has been electronically signed by BETTINA Rubio   June 5, 2025 13:17 EDT

## 2025-07-03 ENCOUNTER — OFFICE VISIT (OUTPATIENT)
Dept: PSYCHIATRY | Facility: CLINIC | Age: 55
End: 2025-07-03
Payer: COMMERCIAL

## 2025-07-03 DIAGNOSIS — F42.2 MIXED OBSESSIONAL THOUGHTS AND ACTS: Primary | ICD-10-CM

## 2025-07-03 PROCEDURE — 90837 PSYTX W PT 60 MINUTES: CPT | Performed by: COUNSELOR

## 2025-07-03 NOTE — PROGRESS NOTES
Date:2025   Patient Name: Aruna Heranndez  : 1970   MRN: [id number]   Time IN: 12:05    Time OUT: 1:02    Referring Provider: Amy Conrad MD    PROGRESS NOTE    History of Present Illness:   Aruna Hernandez is a 54 y.o. male who is being seen today for follow up individual Psychotherapy session.     Chief Complaint: At the [place] location for a follow-up appointment.  Patient expressed concerns with feelings of on edge, overwhelmed, excessive worry, compulsions, difficulties with sleep, mood regulation, intrusive thoughts, and feelings of difficulties with fitting in.    ICD-10-CM ICD-9-CM   1. Mixed obsessional thoughts and acts  F42.2 300.3        Clinical Maneuvering/Intervention: EMDR to assist with mixed obsessional thoughts and acts    Phases utilized of EMDR: Phase 2 Preparation: client stabilization, resource building, EMDR therapy orientation, addressing client's concerns, addressing client's questions, oriented to bilateral forms of dual stimuli (MCKEON) and technical set up, evaluating appropriateness going further.  Phase 3 Assessment: setting up target sequences for reprocessing of the traumatic memories.  Phase 4 Desensitization: application of bilateral MCKEON to target sequence for the purpose of moving disturbances through the adaptive resolution.   Target memory: When mom talked about dad  Negative Cognition: I am insignificant  Positive Cognition: I can trust myself  SUDs Start:6  SUDs End:did not complete phase 4  VOC Start:1  VOC End:Did not complete phase 4    Allowed patient to freely discuss issues without interruption or judgment. Provided safe, confidential environment to facilitate the development of positive therapeutic relationship and encourage open, honest communication. Assisted patient in identifying risk factors which would indicate the need for higher level of care including thoughts to harm self or others and/or self-harming behavior and encouraged patient to  contact this office, call 911, or present to the nearest emergency room should any of these events occur. Discussed crisis intervention services and means to access. Patient adamantly and convincingly denies current suicidal or homicidal ideation or perceptual disturbance.    Assessment Scores:   PHQ-9 Total Score:    CARLOS EDUARDO-7 Score:    PTSD Total Score: .PTSDTOTALSCORE    (Scales based on 0 - 10 with 10 being the worst)  Depression: 6 Anxiety 8       Mental Status Exam:   Hygiene:   good  Cooperation:  Cooperative  Eye Contact:  Good  Psychomotor Behavior:  Restless  Affect:  Appropriate  Mood: anxious  Speech:  Normal  Thought Process:  Goal directed and Linear  Thought Content:  Normal  Suicidal:  None  Homicidal:  None  Hallucinations:  None  Delusion:  None  Memory:  Intact  Orientation:  Person, Place, Time, and Situation  Reliability:  good  Insight:  Good  Judgement:  Fair  Impulse Control:  Fair  Physical/Medical Issues:  No      Patient's Support Network Includes:      Functional Status: Moderate impairment     Progress toward goal: At goal    Prognosis: Good with Ongoing Treatment     Medications:     Current Outpatient Medications:     Azelastine-Fluticasone 137-50 MCG/ACT suspension, , Disp: , Rfl:     desvenlafaxine (Pristiq) 100 MG 24 hr tablet, Take 1 tablet by mouth Daily., Disp: 30 tablet, Rfl: 2    fenofibrate (FENOGLIDE) 40 MG tablet, , Disp: , Rfl:     Gabapentin Enacarbil ER (Horizant) 300 MG tablet controlled-release, , Disp: , Rfl:     latanoprost (XALATAN) 0.005 % ophthalmic solution, , Disp: , Rfl:     lisinopril (PRINIVIL,ZESTRIL) 5 MG tablet, , Disp: , Rfl:     Loratadine 10 MG capsule, , Disp: , Rfl:     montelukast (SINGULAIR) 10 MG tablet, , Disp: , Rfl:     omeprazole (priLOSEC) 20 MG capsule, , Disp: , Rfl:     pravastatin (PRAVACHOL) 40 MG tablet, , Disp: , Rfl:     Visit Diagnosis/Orders Placed This Visit:    ICD-10-CM ICD-9-CM   1. Mixed obsessional thoughts and acts  F42.2  300.3        PLAN:  Safety: No acute safety concerns  Risk Assessment: Risk of self-harm acutely is low. Risk of self-harm chronically is also low, but could be further elevated in the event of treatment noncompliance and/or AODA.    Crisis Plan:  Symptoms and/or behaviors to indicate a crisis: Excessive worry or fear, Feeling sad or low, Prolonged irritability or anger, Lack of sleep, and Self-doubt    What calming techniques or other strategies will patient use to de-escalate and stay safe: slow down, breathe, visualize calming self, think it though, listen to music, change focus, take a walk    Who is one person patient can contact to assist with de-escalation?      Treatment Plan/Goals: Patient will continue supportive psychotherapy efforts and medication regimen as prescribed. Therapist will provide EMDR Therapy to assist patient in improving functioning and gaining coping skills, maintaining stability, and avoiding decompensation and the need for higher level of care. Plan for treatment was discussed during today's visit. Patient acknowledged and verbally consented to continue with current treatment plan and was educated on the importance of compliance with treatment and follow-up appointments.     Patient will contact this office, call 911 or present to the nearest emergency room should suicidal or homicidal ideations occur.     Follow Up:   No follow-ups on file.      BETTINA Rubio   [facility]     This document has been electronically signed by BETTINA Rubio   July 3, 2025 13:04 EDT

## 2025-07-16 ENCOUNTER — TELEMEDICINE (OUTPATIENT)
Dept: PSYCHIATRY | Facility: CLINIC | Age: 55
End: 2025-07-16
Payer: COMMERCIAL

## 2025-07-16 DIAGNOSIS — F41.1 GENERALIZED ANXIETY DISORDER: Primary | ICD-10-CM

## 2025-07-16 DIAGNOSIS — F33.0 MILD EPISODE OF RECURRENT MAJOR DEPRESSIVE DISORDER: ICD-10-CM

## 2025-07-16 RX ORDER — DESVENLAFAXINE 100 MG/1
100 TABLET, EXTENDED RELEASE ORAL DAILY
Qty: 30 TABLET | Refills: 2 | Status: SHIPPED | OUTPATIENT
Start: 2025-07-16

## 2025-07-16 NOTE — PROGRESS NOTES
"Mode of Visit: Video   Location of patient: -HOME-   Location of provider: +[facility]+   You have chosen to receive care through a telehealth visit.   The patient has signed the video visit consent form.   The visit included audio and video interaction. No technical issues occurred during this visit.     Subjective   Aruna Hernandez is a 55 y.o. male who presents today for follow up    Chief Complaint:  Anxiety, depression and poor concentration     History of Present Illness:   History of Present Illness  Aruna Hernandez presents for medication management follow-up. His last follow-up appointment was 4/16/2025. Voices that he has been doing well since last visit. 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 Struggles at times with moments of depression, saying that he sometimes \"get into a funk.\" Reported symptoms of depression include little interest/pleasure in doing things, feeling down, sleep issues, appetite changes. Also experiencing some anxiety including feeling anxious, nervous, on edge, worry, trouble relaxing, restlessness. Currently taking Pristiq 100 mg daily. Symptoms of anxiety and depression have been at baseline, but does feel that the symptoms could be better improved. Has surgery scheduled on 8/25/2025 y in the future to remove adenoma. Currently doing EMDR, has continued with routine therapy appointments.Denies SI/HI. PHQ-9 total score: 15 (previously 5), CARLOS EDUARDO-7 total score: 14 (previously 14).     Previous Psych Meds: Zoloft (10 years), Paxil (x4 years), Wellbutrin (anger and irritability), Qelbree (ineffective), Adderall XR, Vyvanse, Mydayis  Depression  Presents for follow-up visit. Symptoms include excessive worry, insomnia, irritability, nervousness/anxiety, obsessions, malaise/fatigue and difficulty controlling mood. Patient is not experiencing: dry mouth, hypersomnia, shortness of breath, suicidal " ideas, weight gain, weight loss and chest pain.Symptoms occur constantly.  The severity of symptoms is moderate.  The patient sleeps 6 hours per night. His past medical history is significant for depression. Compliance with medications is %.      The following portions of the patient's history were reviewed and updated as appropriate: allergies, current medications, past family history, past medical history, past social history, past surgical history and problem list.    Past Medical History:   Diagnosis Date    ADHD (attention deficit hyperactivity disorder) 2/28/2023    I have been seeing Dyllan Campbell a local therapist who brought it to my attention.  He did not diagnose, he could not do that.    Depression September 1993    Depressive disorder 03/20/2018     Social History     Socioeconomic History    Marital status:    Tobacco Use    Smoking status: Never     Passive exposure: Never    Smokeless tobacco: Never   Vaping Use    Vaping status: Never Used   Substance and Sexual Activity    Alcohol use: Not Currently     Comment: Binge drinker.  No longer drink.    Drug use: Never    Sexual activity: Yes     Partners: Male     Birth control/protection: Condom, Partner of same sex     Family History   Problem Relation Age of Onset    Depression Mother         Off and on throughout life.    ADD / ADHD Father         Bio-Dad, never known him. Step dad raised me.    Alcohol abuse Father     Depression Father     Alcohol abuse Maternal Grandfather         Passed away 11/1986    Alcohol abuse Sister         Half sister    Depression Sister     Drug abuse Sister     Paranoid behavior Sister      Past Surgical History:   Procedure Laterality Date    ABDOMINAL SURGERY  2018    Gallbladder removed    COLONOSCOPY  2022    ENDOSCOPY  2021    HERNIA REPAIR  1972    TONSILLECTOMY  2007     Patient Active Problem List   Diagnosis    Seasonal allergic rhinitis    Restless legs    Overweight    Nausea    Mild  intermittent asthma    Inactive tuberculosis    Hyperlipidemia    Depressive disorder    Change in weight     Allergies   Allergen Reactions    Azithromycin Unknown - High Severity    Ibuprofen Unknown - High Severity     Current Outpatient Medications   Medication Sig Dispense Refill    desvenlafaxine (Pristiq) 100 MG 24 hr tablet Take 1 tablet by mouth Daily. 30 tablet 2    Azelastine-Fluticasone 137-50 MCG/ACT suspension       fenofibrate (FENOGLIDE) 40 MG tablet       Gabapentin Enacarbil ER (Horizant) 300 MG tablet controlled-release       latanoprost (XALATAN) 0.005 % ophthalmic solution       lisinopril (PRINIVIL,ZESTRIL) 5 MG tablet       Loratadine 10 MG capsule       montelukast (SINGULAIR) 10 MG tablet       omeprazole (priLOSEC) 20 MG capsule        No current facility-administered medications for this visit.     Review of Systems   Constitutional:  Positive for irritability and malaise/fatigue. Negative for activity change, appetite change, unexpected weight gain and unexpected weight loss.   Respiratory:  Negative for shortness of breath.    Cardiovascular:  Negative for chest pain.   Psychiatric/Behavioral:  Positive for dysphoric mood and sleep disturbance. Negative for suicidal ideas. The patient is nervous/anxious and has insomnia.      Physical Exam  Constitutional:       General: He is not in acute distress.     Appearance: Normal appearance.   Neurological:      Mental Status: He is alert.     Vitals:   The [facility]. Unable to obtain vital signs due to nature of remote visit.    Mental Status Exam:   Hygiene:   appears good  Cooperation:  Cooperative  Eye Contact:  [facility] r/t video visit  Psychomotor Behavior:  Appropriate  Affect:  Full range and Appropriate  Mood: normal  Hopelessness: Denies  Speech:  Talkative  Thought Process:  Goal directed and Linear  Thought Content:  Mood congruent  Suicidal:  None  Homicidal:   None  Hallucinations:  None  Delusion:  None  Memory:  Intact  Orientation:  Person, Place, Time, and Situation  Reliability:  good  Insight:  Good  Judgement:  Good  Impulse Control:  Good    Assessment & Plan   Problems Addressed this Visit    None  Visit Diagnoses         Generalized anxiety disorder    -  Primary    Relevant Medications    desvenlafaxine (Pristiq) 100 MG 24 hr tablet      Mild episode of recurrent major depressive disorder        Relevant Medications    desvenlafaxine (Pristiq) 100 MG 24 hr tablet          Diagnoses         Codes Comments      Generalized anxiety disorder    -  Primary ICD-10-CM: F41.1  ICD-9-CM: 300.02       Mild episode of recurrent major depressive disorder     ICD-10-CM: F33.0  ICD-9-CM: 296.31           Visit Diagnoses:    ICD-10-CM ICD-9-CM   1. Generalized anxiety disorder  F41.1 300.02   2. Mild episode of recurrent major depressive disorder  F33.0 296.31     Today's visit is for medication management. Currently doing well with medication regimen, experiences intermittent episodes of depression along with some anxiety. Has been consistent with routine therapy that includes EMDR and feels this has been beneficial. Discussed medication regimen, voices interest in continuing with Pristiq 100 mg daily for now, then reevaluate medications after surgery for adenoma removal (scheduled for 8/25/25).    -Continue Pristiq 100 mg daily     -Reviewed previous available documentation and most recent available labs. AMAURI reviewed and is appropriate.    GOALS:  Short Term Goals: Patient will be compliant with medication, and patient will have no significant medication related side effects.  Patient will be engaged in psychotherapy as indicated.  Patient will report subjective improvement of symptoms.  Long term goals: To stabilize mood and treat/improve subjective symptoms, the patient will stay out of the hospital, the patient will be at an optimal level of functioning, and the  patient will take all medications as prescribed.  The patient/guardian verbalized understanding and agreement with goals that were mutually set.    TREATMENT PLAN: Continue supportive psychotherapy efforts and medications as indicated for patient's diagnosis.  Pharmacological and Non-Pharmacological treatment options discussed during today's visit. Patient/Guardian acknowledged and verbally consented with current treatment plan and was educated on the importance of compliance with treatment and follow-up appointments.      MEDICATION ISSUES:  Discussed medication options and treatment plan of prescribed medication as well as the risks, benefits, any black box warnings, and side effects including potential falls, possible impaired driving, and metabolic adversities among others. Patient is agreeable to call the office with any worsening of symptoms or onset of side effects, or if any concerns or questions arise.  The contact information for the office is made available to the patient. Patient is agreeable to call 911 or go to the nearest ER should they begin having any SI/HI, or if any urgent concerns arise. No medication side effects or related complaints today.     MEDS ORDERED DURING VISIT:  New Medications Ordered This Visit   Medications    desvenlafaxine (Pristiq) 100 MG 24 hr tablet     Sig: Take 1 tablet by mouth Daily.     Dispense:  30 tablet     Refill:  2       FOLLOW UP:  Return in about 10 weeks (around 9/24/2025) for Recheck, Video visit.             This document has been electronically signed by CECILIA Walters  July 31, 2025 00:00 EDT    Please note that portions of this note were completed with a voice recognition program. Efforts were made to edit dictation, but occasionally words are mistranscribed.